# Patient Record
Sex: FEMALE | Race: ASIAN | NOT HISPANIC OR LATINO | URBAN - METROPOLITAN AREA
[De-identification: names, ages, dates, MRNs, and addresses within clinical notes are randomized per-mention and may not be internally consistent; named-entity substitution may affect disease eponyms.]

---

## 2019-01-23 ENCOUNTER — INPATIENT (INPATIENT)
Facility: HOSPITAL | Age: 84
LOS: 6 days | Discharge: HOSPICE | End: 2019-01-30
Attending: INTERNAL MEDICINE | Admitting: INTERNAL MEDICINE
Payer: MEDICARE

## 2019-01-23 VITALS
DIASTOLIC BLOOD PRESSURE: 80 MMHG | HEIGHT: 60 IN | OXYGEN SATURATION: 98 % | RESPIRATION RATE: 16 BRPM | TEMPERATURE: 97 F | WEIGHT: 125.66 LBS | SYSTOLIC BLOOD PRESSURE: 127 MMHG | HEART RATE: 76 BPM

## 2019-01-23 DIAGNOSIS — R62.7 ADULT FAILURE TO THRIVE: ICD-10-CM

## 2019-01-23 LAB
BASOPHILS # BLD AUTO: 0.03 K/UL — SIGNIFICANT CHANGE UP (ref 0–0.2)
BASOPHILS NFR BLD AUTO: 0.3 % — SIGNIFICANT CHANGE UP (ref 0–2)
EOSINOPHIL # BLD AUTO: 0.36 K/UL — SIGNIFICANT CHANGE UP (ref 0–0.5)
EOSINOPHIL NFR BLD AUTO: 3.3 % — SIGNIFICANT CHANGE UP (ref 0–6)
HCT VFR BLD CALC: 38.2 % — SIGNIFICANT CHANGE UP (ref 34.5–45)
HGB BLD-MCNC: 12 G/DL — SIGNIFICANT CHANGE UP (ref 11.5–15.5)
IMM GRANULOCYTES NFR BLD AUTO: 0.9 % — SIGNIFICANT CHANGE UP (ref 0–1.5)
LYMPHOCYTES # BLD AUTO: 1.38 K/UL — SIGNIFICANT CHANGE UP (ref 1–3.3)
LYMPHOCYTES # BLD AUTO: 12.7 % — LOW (ref 13–44)
MCHC RBC-ENTMCNC: 30 PG — SIGNIFICANT CHANGE UP (ref 27–34)
MCHC RBC-ENTMCNC: 31.4 % — LOW (ref 32–36)
MCV RBC AUTO: 95.5 FL — SIGNIFICANT CHANGE UP (ref 80–100)
MONOCYTES # BLD AUTO: 0.8 K/UL — SIGNIFICANT CHANGE UP (ref 0–0.9)
MONOCYTES NFR BLD AUTO: 7.4 % — SIGNIFICANT CHANGE UP (ref 2–14)
NEUTROPHILS # BLD AUTO: 8.16 K/UL — HIGH (ref 1.8–7.4)
NEUTROPHILS NFR BLD AUTO: 75.4 % — SIGNIFICANT CHANGE UP (ref 43–77)
NRBC # FLD: 0.02 K/UL — LOW (ref 25–125)
PLATELET # BLD AUTO: 138 K/UL — LOW (ref 150–400)
PMV BLD: 13.1 FL — HIGH (ref 7–13)
RBC # BLD: 4 M/UL — SIGNIFICANT CHANGE UP (ref 3.8–5.2)
RBC # FLD: 13.7 % — SIGNIFICANT CHANGE UP (ref 10.3–14.5)
WBC # BLD: 10.83 K/UL — HIGH (ref 3.8–10.5)
WBC # FLD AUTO: 10.83 K/UL — HIGH (ref 3.8–10.5)

## 2019-01-23 RX ORDER — INFLUENZA VIRUS VACCINE 15; 15; 15; 15 UG/.5ML; UG/.5ML; UG/.5ML; UG/.5ML
0.5 SUSPENSION INTRAMUSCULAR ONCE
Qty: 0 | Refills: 0 | Status: COMPLETED | OUTPATIENT
Start: 2019-01-23 | End: 2019-01-23

## 2019-01-23 NOTE — H&P ADULT - NSHPLABSRESULTS_GEN_ALL_CORE
12.0   10.83 )-----------( 138      ( 23 Jan 2019 22:55 )             38.2     Chem panel pending    Coags pending     [facility] 1/23 Labs                13.5  11.3 )-----------( 137  74% Neutrophils                   43.2       Chem Panel   - Na 143, Chloride 113, CO2 20, Glucose 232, BUN 7, Cr. 0.53, Calcium 10.2    PTT 58.4 (pt. accepted with heparin running at 4.9 units)    BCx negative, UCx + Proteus (sensitive to CTX)    Imaging at [facility] -     LE Duplex = positive for LLE acute DVT  CTH = negative for intracranial hemorrhage  TTE = LV hypertrophy, preserved LVEF, normal RV systolic function, moderate mitral regurgitation   CXR 1/20 - progression of bibasilar opacities ; 1/22 - increased right basilar infiltrate, large hiatal hernia 12.0   10.83 )-----------( 138      ( 23 Jan 2019 22:55 )             38.2     01-23    138  |  114<H>  |  7   ----------------------------<  192<H>  5.7<H>   |  15<L>  |  0.48<L>    Ca    10.0      23 Jan 2019 22:55  Phos  3.0     01-23  Mg     1.9     01-23    TPro  5.9<L>  /  Alb  1.9<L>  /  TBili  0.2  /  DBili  x   /  AST  52<H>  /  ALT  13  /  AlkPhos  65  01-23    Coags pending     [facility] 1/23 Labs                13.5  11.3 )-----------( 137  74% Neutrophils                   43.2     Chem Panel 1/23 at 7AM  - Na 143, Chloride 113, CO2 20, Glucose 232, BUN 7, Cr. 0.53, Calcium 10.2    PTT 58.4 (pt. accepted with heparin running at 4.9 units)    BCx negative, UCx + Proteus (sensitive to CTX)    Imaging at [facility] -     LE Duplex = positive for LLE acute DVT  CTH = negative for intracranial hemorrhage  TTE = LV hypertrophy, preserved LVEF, normal RV systolic function, moderate mitral regurgitation   CXR 1/20 - progression of bibasilar opacities ; 1/22 - increased right basilar infiltrate, large hiatal hernia

## 2019-01-23 NOTE — H&P ADULT - PROBLEM SELECTOR PLAN 1
- at baseline pt. has advanced dementia (non-verbal, bedbound)  - presented with worsening lethargy of 3 days duration at [facility] in setting of sepsis from UTI vs. PNA (possible aspiration event)   - per outside hospital documentation, mental status close to baseline per family after few days of treatment  - management of UTI and PNA as below  - failed speech and swallow, was planned for PEG with GI 1/24 had pt. not been transferred, discuss with family in morning and reconsult GI if goals remain the same  - fall and aspiration precautions

## 2019-01-23 NOTE — H&P ADULT - PROBLEM SELECTOR PLAN 9
- DVT ppx: therapeutically anticoagulated on heparin drip - attempted to reach daughter Cait Iglesias, however no answer  - need goals of care discussion regarding code status, confirm whether family wants PEG, possible palliative consult based on conversation  - FULL CODE, given that unable to reach family, and not documentation to suggest otherwise on transfer paperwork

## 2019-01-23 NOTE — H&P ADULT - NSHPPHYSICALEXAM_GEN_ALL_CORE
Vital Signs Last 24 Hrs  T(C): 36.2 (23 Jan 2019 21:52), Max: 36.2 (23 Jan 2019 21:52)  T(F): 97.1 (23 Jan 2019 21:52), Max: 97.1 (23 Jan 2019 21:52)  HR: 76 (23 Jan 2019 21:52) (76 - 76)  BP: 127/80 (23 Jan 2019 21:52) (127/80 - 127/80)  RR: 16 (23 Jan 2019 21:52) (16 - 16)  SpO2: 98% (23 Jan 2019 21:52) (98% - 98%)    General: WN/WD NAD  Neurology: responsive to sternal rub, spontaneously moving all extremities  Eyes: PERRLA  ENT/Neck: Neck supple, trachea midline, No JVD, Gross hearing intact  Respiratory: CTA B/L, No wheezing, rales, rhonchi  CV: RRR, S1S2, no murmurs, rubs or gallops  Abdominal: Soft, NT, ND +BS,   Extremities: No edema, + peripheral pulses, b/l UE edema   Skin: No Rashes, Hematoma, Ecchymosis Vital Signs Last 24 Hrs  T(C): 36.2 (23 Jan 2019 21:52), Max: 36.2 (23 Jan 2019 21:52)  T(F): 97.1 (23 Jan 2019 21:52), Max: 97.1 (23 Jan 2019 21:52)  HR: 76 (23 Jan 2019 21:52) (76 - 76)  BP: 127/80 (23 Jan 2019 21:52) (127/80 - 127/80)  RR: 16 (23 Jan 2019 21:52) (16 - 16)  SpO2: 98% (23 Jan 2019 21:52) (98% - 98%)    General: WN/WD NAD  Neurology: non-verbal at baseline, responds to verbal and painful stimuli, spontaneously moving all extremities  Eyes: PERRLA  ENT/Neck: Neck supple, trachea midline, No JVD, Gross hearing intact  Respiratory: coarse breath sounds bilaterally, No wheezing, rales, rhonchi  CV: RRR, S1S2, no murmurs, rubs or gallops  Abdominal: Soft, NT, ND +BS,   Extremities: No edema, + peripheral pulses, b/l UE edema   Skin: No Rashes, Hematoma, Ecchymosis

## 2019-01-23 NOTE — H&P ADULT - PMH
Diabetes Mellitus    GERD (Gastroesophageal Reflux Disease)    H/O: Osteoarthritis    HTN (Hypertension), Benign

## 2019-01-23 NOTE — PATIENT PROFILE ADULT - NSPROGENARRIVEDFROM_GEN_A_NUR
\[facility] [place] [facility]\"" [facility]

## 2019-01-23 NOTE — H&P ADULT - PROBLEM SELECTOR PLAN 5
- at [facility], initially hypernatremic to 150s, started on D5W, later transitioned to D5 1/2 NS, 1/23 7AM Na 143 prior to transfer, [facility] admission labs with Na 138  - likely 2/2 no PO intake, failed speech and swallow, NGT placement at OSH unsuccessful after multiple attempts  - will c/w D5 1/2 NS at 50cc/hour for now  - trend daily BMP

## 2019-01-23 NOTE — H&P ADULT - PROBLEM SELECTOR PLAN 8
- discussed with daughter Shaka Iglesias [phone]  - - transfer paperwork without list of home medications   - unable to reach family at home, discuss in morning regarding home medication list, and resume IV equivalents as indicated as pt. NPO currently without NG tube

## 2019-01-23 NOTE — H&P ADULT - PROBLEM SELECTOR PLAN 4
- UCx at [facility] with Proteus, sensitive to ceftriaxone   - s/p ceftriaxone for 6 days (1/19 - 1/24)  - will hold off further abx for now, pt. arrived with tommie from outside hospital  - consider trial of void - UCx at [facility] with Proteus, sensitive to ceftriaxone   - s/p ceftriaxone for 4 days (1/19 -1/22, transferred on 1/23)  - will give 1 dose of ceftriaxone now to complete 5 day course  - reilly in place from outside hospital, consider trial of void

## 2019-01-23 NOTE — H&P ADULT - HISTORY OF PRESENT ILLNESS
History obtained from [facility] Transfer Paperwork.     88F hx of advanced dementia (at baseline, bedbound, nonverbal, can track and take PO feeds), HTN, HLD, DM, who presented to [facility] on 1/19 with altered mental status, cough, and shortness of breath. Pt. had productive cough with yellow sputum, associated with SOB, and experienced subjective fevers at home over the past three days prior to admission. She also had decreased PO intake, home diet usually consists of pureed food and thickened fluids, which pt. was noted to be coughing with. In addition, pts. urine had also appeared pink to HHA/daughter. Mental status also worsened with pt. becoming more lethargic prompting family to bring her to the ED. Upon arrival pt was placed on BIPAP and admitted to MICU. CTH was negative for acute bleed, and encephalopathy was attributed to sepsis possibly 2/2 PNA. She was started on ceftriaxone for possible UTI  and PNA. Chest XRAY on admission showed no acute pulmonary abnormality, however CXR from 1/22 showed increase in right basilar opacity. Further infectious workup - BCx remained negative, UCx positive for proteus sensitive to ceftriaxone. Course was complicated by acute LLE DVT for which she was started on a patient specific heparin drip (goal 50-60, due to high risk of bleeding). She was also hypernatremic to the 150s, and started on D5W initially, later switched to D5 1/2 NS, with Na level 143 prior to transfer. Multiple attempts to place an NG tube was unsuccessful given large hiatal hernia. Mental status gradually improved, BIPAP de-escalated to NC, and pt. was transferred to medicine floors. Per family pt. was back to her baseline. Floor course c/b episode of respiratory distress, however BIPAP was not started given secretions and lethargy, oxygenation wnl with 5L NC. ABG was unremarkable for CO2 retention. Speech and swallow was consulted, not recommended for PEG tube. Palliative care was consulted however family requested transfer to [facility]. GI was consulted and PEG was tentatively scheduled for 1/24 AM if pt. was not transferred by that time.     On arrival to [facility] - pt. on heparin drip at 4.9 units, and D5 1/2 NS @50cc/hr. PTT prior to transfer noted to be 58. History obtained from [facility] Transfer Paperwork.     88F hx of advanced dementia (at baseline, bedbound, nonverbal, can track and take PO feeds), HTN, HLD, DM, who presented to [facility] on 1/19 with altered mental status, cough, and shortness of breath. Pt. had productive cough with yellow sputum, associated with SOB, and experienced subjective fevers at home over the past three days prior to admission. She also had decreased PO intake, home diet usually consists of pureed food and thickened fluids, which pt. was noted to be coughing with. In addition, pts. urine had also appeared pink to HHA/daughter. Mental status also worsened with pt. becoming more lethargic prompting family to bring her to the ED. Upon arrival pt was placed on BIPAP and admitted to MICU. CTH was negative for acute bleed, and encephalopathy was attributed to sepsis possibly 2/2 PNA. She was started on ceftriaxone for possible UTI  and PNA. Chest XRAY on admission showed no acute pulmonary abnormality, however CXR from 1/22 showed increase in right basilar opacity. Further infectious workup - BCx remained negative, UCx positive for proteus sensitive to ceftriaxone. Course was complicated by acute LLE DVT for which she was started on a patient specific heparin drip (goal 50-60, due to high risk of bleeding). She was also hypernatremic to the 150s, and started on D5W initially, later switched to D5 1/2 NS, with Na level 143 prior to transfer. Multiple attempts to place an NG tube was unsuccessful given large hiatal hernia. Mental status gradually improved, BIPAP de-escalated to NC, and pt. was transferred to medicine floors. Per family pt. was back to her baseline. Floor course c/b episode of respiratory distress, however BIPAP was not started given secretions and lethargy, oxygenation wnl with 5L NC. ABG was unremarkable for CO2 retention. Speech and swallow was consulted, not recommended for PEG tube. Palliative care was consulted however family requested transfer to [facility]. GI was consulted and PEG was tentatively scheduled for 1/24 AM if pt. was not transferred by that time.     On arrival to [facility] - pt. on heparin drip at 490 units/hr and D5 1/2 NS @50cc/hr. PTT prior to transfer noted to be 58.

## 2019-01-23 NOTE — H&P ADULT - PROBLEM SELECTOR PLAN 3
- p/w cough and subjective fevers, CXR 1/22 with increased right basilar opacity   - likely aspiration PNA, as pt. was coughing with soft foods at home  - pt. completed 6 day course of ceftriaxone and azithromycin (1/19-1/24)  - given that pt. is afebrile, will hold off abx for now as she completed adequate treatment for CAP, however not adequately treated for aspiration PNA given no anaerobic coverage with CTX/Azithromycin  - if pt. develops fever, start zosyn for aspiration PNA - p/w cough and subjective fevers, CXR 1/22 with increased right basilar opacity   - likely aspiration PNA, as pt. was coughing with soft foods at home  - pt. completed 4 days of ceftriaxone and azithromycin (1/19 -1/22, transferred on 1/23)  - will give 1 dose of CTX to complete 5 day duration for CAP, however pt. not adequately treated for aspiration PNA given no anaerobic coverage with CTX/Azithromycin  - if pt. develops fever, start zosyn for aspiration PNA

## 2019-01-23 NOTE — H&P ADULT - PROBLEM SELECTOR PLAN 6
- controlled - controlled  - unclear home medication list  - pt. NPO, will administer IV antihypertensives as needed  - vital signs per routine

## 2019-01-23 NOTE — PATIENT PROFILE ADULT - STATED REASON FOR ADMISSION
Pt at [facility] admitted with cold and difficulty swallowing.. has been on puree for years

## 2019-01-23 NOTE — H&P ADULT - ASSESSMENT
88F hx of advanced dementia (at baseline, bedbound, nonverbal, can track and take PO feeds), HTN, HLD, DM, who presented to [facility] 1/19 with AMS, cough, subjective fevers, admitted to MICU on BIPAP, and started on CTX/azithromycin for sepsis 2/2 PNA and/or UTI. Workup positive for UCx + Proteus (sensitive to CTX) and right basilar opacity on CXR. Course c/b on LLE DVT, on patient specific heparin gtt (goal 50-60 due to high bleeding risk - hematuria) and hypernatremia to 150s, now resolved with IVF. Mental status improved, however patient failed speech and swallow, with GI planning tentative PEG tube placement at OSH on 1/24. Palliative was consulted, however family requested transfer to [facility]. Pt. transferred to [facility] on 1/24 for further management.

## 2019-01-23 NOTE — H&P ADULT - ATTENDING COMMENTS
seen and examined  above HPI, PMH, SH, FH, Meds, Allergies and ROS noted and personally confirmed as accurate by me with family at bedside    PHYSICAL EXAM: vital signs as above  in no apparent distress  Neck: Supple, no JVD, no thyromegaly  Lungs: no rhonchi, scattered bilateral wheeze, no crackles very poor cooperation  CVS: S1 S2 soft ejection systolic murmur best heard at left sternal border   Abdomen: no tenderness, no organomegaly, BS present  Neuro: minimally responsive no focal weakness  Psych: minimally responsive   Skin: warm, dry  Ext: no cyanosis or clubbing, no edema  Msk: no joint swelling or deformities  Back: no CVA tenderness, no     Plan: d/w Dtr Mary Grace (AKA Cait)  no PEG  comfort care  does want antibiotics transfusions etc  no invasive intervention required at this time    ID evaluation Dr Givens will see  may require antibiotics for possible persistent HCAP  defer to ID    prognosis overall endstage

## 2019-01-23 NOTE — PATIENT PROFILE ADULT - FALL HARM RISK
coagulation(Bleeding disorder R/T clinical cond/anti-coags)/bones(Osteoporosis,prev fx,steroid use,metastatic bone ca)/age(85 years old or older)

## 2019-01-23 NOTE — H&P ADULT - PSH
History of Arthroscopy    History of Arthroscopy of Knee -  right    History of Total Knee  left in 2003

## 2019-01-23 NOTE — H&P ADULT - PROBLEM SELECTOR PLAN 2
- acute LLE DVT at outside hospital   - started on heparin drip with patient specific normogram, goal PTT 50-60 given high risk of bleeding (hematuria)  - heparin drip current at 4.9 units, PTT at outside hospital prior to transfer 58, will recheck and monitor PTT [id number] - acute LLE DVT at outside hospital   - started on heparin drip with patient specific normogram, goal PTT 50-60 given high risk of bleeding (hematuria)  - heparin drip current at 490 units/hr from outside hospital, PTT at outside hospital prior to transfer 58, will recheck and monitor PTT [id number]

## 2019-01-24 DIAGNOSIS — I82.402 ACUTE EMBOLISM AND THROMBOSIS OF UNSPECIFIED DEEP VEINS OF LEFT LOWER EXTREMITY: ICD-10-CM

## 2019-01-24 DIAGNOSIS — N39.0 URINARY TRACT INFECTION, SITE NOT SPECIFIED: ICD-10-CM

## 2019-01-24 DIAGNOSIS — Z51.5 ENCOUNTER FOR PALLIATIVE CARE: ICD-10-CM

## 2019-01-24 DIAGNOSIS — I10 ESSENTIAL (PRIMARY) HYPERTENSION: ICD-10-CM

## 2019-01-24 DIAGNOSIS — Z71.89 OTHER SPECIFIED COUNSELING: ICD-10-CM

## 2019-01-24 DIAGNOSIS — E11.9 TYPE 2 DIABETES MELLITUS WITHOUT COMPLICATIONS: ICD-10-CM

## 2019-01-24 DIAGNOSIS — Z79.899 OTHER LONG TERM (CURRENT) DRUG THERAPY: ICD-10-CM

## 2019-01-24 DIAGNOSIS — F03.90 UNSPECIFIED DEMENTIA WITHOUT BEHAVIORAL DISTURBANCE: ICD-10-CM

## 2019-01-24 DIAGNOSIS — E87.0 HYPEROSMOLALITY AND HYPERNATREMIA: ICD-10-CM

## 2019-01-24 DIAGNOSIS — G93.40 ENCEPHALOPATHY, UNSPECIFIED: ICD-10-CM

## 2019-01-24 DIAGNOSIS — R53.2 FUNCTIONAL QUADRIPLEGIA: ICD-10-CM

## 2019-01-24 DIAGNOSIS — R13.10 DYSPHAGIA, UNSPECIFIED: ICD-10-CM

## 2019-01-24 DIAGNOSIS — J18.9 PNEUMONIA, UNSPECIFIED ORGANISM: ICD-10-CM

## 2019-01-24 DIAGNOSIS — Z29.9 ENCOUNTER FOR PROPHYLACTIC MEASURES, UNSPECIFIED: ICD-10-CM

## 2019-01-24 LAB
ALBUMIN SERPL ELPH-MCNC: 1.9 G/DL — LOW (ref 3.3–5)
ALP SERPL-CCNC: 65 U/L — SIGNIFICANT CHANGE UP (ref 40–120)
ALT FLD-CCNC: 13 U/L — SIGNIFICANT CHANGE UP (ref 4–33)
ANION GAP SERPL CALC-SCNC: 10 MMO/L — SIGNIFICANT CHANGE UP (ref 7–14)
ANION GAP SERPL CALC-SCNC: 8 MMO/L — SIGNIFICANT CHANGE UP (ref 7–14)
ANION GAP SERPL CALC-SCNC: 9 MMO/L — SIGNIFICANT CHANGE UP (ref 7–14)
APTT BLD: 33.3 SEC — SIGNIFICANT CHANGE UP (ref 27.5–36.3)
APTT BLD: 45.2 SEC — HIGH (ref 27.5–36.3)
APTT BLD: 79.8 SEC — HIGH (ref 27.5–36.3)
AST SERPL-CCNC: 52 U/L — HIGH (ref 4–32)
BILIRUB SERPL-MCNC: 0.2 MG/DL — SIGNIFICANT CHANGE UP (ref 0.2–1.2)
BUN SERPL-MCNC: 6 MG/DL — LOW (ref 7–23)
BUN SERPL-MCNC: 7 MG/DL — SIGNIFICANT CHANGE UP (ref 7–23)
BUN SERPL-MCNC: 7 MG/DL — SIGNIFICANT CHANGE UP (ref 7–23)
CALCIUM SERPL-MCNC: 10 MG/DL — SIGNIFICANT CHANGE UP (ref 8.4–10.5)
CALCIUM SERPL-MCNC: 10.1 MG/DL — SIGNIFICANT CHANGE UP (ref 8.4–10.5)
CALCIUM SERPL-MCNC: 9.7 MG/DL — SIGNIFICANT CHANGE UP (ref 8.4–10.5)
CHLORIDE SERPL-SCNC: 113 MMOL/L — HIGH (ref 98–107)
CHLORIDE SERPL-SCNC: 114 MMOL/L — HIGH (ref 98–107)
CHLORIDE SERPL-SCNC: 114 MMOL/L — HIGH (ref 98–107)
CO2 SERPL-SCNC: 14 MMOL/L — LOW (ref 22–31)
CO2 SERPL-SCNC: 15 MMOL/L — LOW (ref 22–31)
CO2 SERPL-SCNC: 20 MMOL/L — LOW (ref 22–31)
CREAT SERPL-MCNC: 0.48 MG/DL — LOW (ref 0.5–1.3)
CREAT SERPL-MCNC: 0.49 MG/DL — LOW (ref 0.5–1.3)
CREAT SERPL-MCNC: 0.56 MG/DL — SIGNIFICANT CHANGE UP (ref 0.5–1.3)
GLUCOSE BLDC GLUCOMTR-MCNC: 121 MG/DL — HIGH (ref 70–99)
GLUCOSE BLDC GLUCOMTR-MCNC: 157 MG/DL — HIGH (ref 70–99)
GLUCOSE BLDC GLUCOMTR-MCNC: 160 MG/DL — HIGH (ref 70–99)
GLUCOSE BLDC GLUCOMTR-MCNC: 203 MG/DL — HIGH (ref 70–99)
GLUCOSE SERPL-MCNC: 192 MG/DL — HIGH (ref 70–99)
GLUCOSE SERPL-MCNC: 206 MG/DL — HIGH (ref 70–99)
GLUCOSE SERPL-MCNC: 214 MG/DL — HIGH (ref 70–99)
HCT VFR BLD CALC: 39.3 % — SIGNIFICANT CHANGE UP (ref 34.5–45)
HGB BLD-MCNC: 11.7 G/DL — SIGNIFICANT CHANGE UP (ref 11.5–15.5)
MAGNESIUM SERPL-MCNC: 1.9 MG/DL — SIGNIFICANT CHANGE UP (ref 1.6–2.6)
MAGNESIUM SERPL-MCNC: 1.9 MG/DL — SIGNIFICANT CHANGE UP (ref 1.6–2.6)
MANUAL SMEAR VERIFICATION: SIGNIFICANT CHANGE UP
MCHC RBC-ENTMCNC: 29.8 % — LOW (ref 32–36)
MCHC RBC-ENTMCNC: 29.8 PG — SIGNIFICANT CHANGE UP (ref 27–34)
MCV RBC AUTO: 100 FL — SIGNIFICANT CHANGE UP (ref 80–100)
MORPHOLOGY BLD-IMP: NORMAL — SIGNIFICANT CHANGE UP
NRBC # FLD: 0.02 K/UL — LOW (ref 25–125)
PHOSPHATE SERPL-MCNC: 2.6 MG/DL — SIGNIFICANT CHANGE UP (ref 2.5–4.5)
PHOSPHATE SERPL-MCNC: 3 MG/DL — SIGNIFICANT CHANGE UP (ref 2.5–4.5)
PLATELET # BLD AUTO: 52 K/UL — LOW (ref 150–400)
PLATELET COUNT - ESTIMATE: SIGNIFICANT CHANGE UP
PMV BLD: 13 FL — SIGNIFICANT CHANGE UP (ref 7–13)
POTASSIUM SERPL-MCNC: 3.2 MMOL/L — LOW (ref 3.5–5.3)
POTASSIUM SERPL-MCNC: 5.7 MMOL/L — HIGH (ref 3.5–5.3)
POTASSIUM SERPL-MCNC: SIGNIFICANT CHANGE UP MMOL/L (ref 3.5–5.3)
POTASSIUM SERPL-SCNC: 3.2 MMOL/L — LOW (ref 3.5–5.3)
POTASSIUM SERPL-SCNC: 5.7 MMOL/L — HIGH (ref 3.5–5.3)
POTASSIUM SERPL-SCNC: SIGNIFICANT CHANGE UP MMOL/L (ref 3.5–5.3)
PROT SERPL-MCNC: 5.9 G/DL — LOW (ref 6–8.3)
RBC # BLD: 3.93 M/UL — SIGNIFICANT CHANGE UP (ref 3.8–5.2)
RBC # FLD: 13.9 % — SIGNIFICANT CHANGE UP (ref 10.3–14.5)
SODIUM SERPL-SCNC: 137 MMOL/L — SIGNIFICANT CHANGE UP (ref 135–145)
SODIUM SERPL-SCNC: 138 MMOL/L — SIGNIFICANT CHANGE UP (ref 135–145)
SODIUM SERPL-SCNC: 142 MMOL/L — SIGNIFICANT CHANGE UP (ref 135–145)
WBC # BLD: 7.14 K/UL — SIGNIFICANT CHANGE UP (ref 3.8–10.5)
WBC # FLD AUTO: 7.14 K/UL — SIGNIFICANT CHANGE UP (ref 3.8–10.5)

## 2019-01-24 PROCEDURE — 99497 ADVNCD CARE PLAN 30 MIN: CPT | Mod: 25

## 2019-01-24 PROCEDURE — 99222 1ST HOSP IP/OBS MODERATE 55: CPT

## 2019-01-24 PROCEDURE — 99223 1ST HOSP IP/OBS HIGH 75: CPT

## 2019-01-24 PROCEDURE — 71045 X-RAY EXAM CHEST 1 VIEW: CPT | Mod: 26

## 2019-01-24 RX ORDER — DEXTROSE 50 % IN WATER 50 %
12.5 SYRINGE (ML) INTRAVENOUS ONCE
Qty: 0 | Refills: 0 | Status: DISCONTINUED | OUTPATIENT
Start: 2019-01-24 | End: 2019-01-26

## 2019-01-24 RX ORDER — CEFTRIAXONE 500 MG/1
1 INJECTION, POWDER, FOR SOLUTION INTRAMUSCULAR; INTRAVENOUS ONCE
Qty: 0 | Refills: 0 | Status: COMPLETED | OUTPATIENT
Start: 2019-01-24 | End: 2019-01-24

## 2019-01-24 RX ORDER — HEPARIN SODIUM 5000 [USP'U]/ML
700 INJECTION INTRAVENOUS; SUBCUTANEOUS
Qty: 25000 | Refills: 0 | Status: DISCONTINUED | OUTPATIENT
Start: 2019-01-24 | End: 2019-01-24

## 2019-01-24 RX ORDER — DEXTROSE 50 % IN WATER 50 %
15 SYRINGE (ML) INTRAVENOUS ONCE
Qty: 0 | Refills: 0 | Status: DISCONTINUED | OUTPATIENT
Start: 2019-01-24 | End: 2019-01-26

## 2019-01-24 RX ORDER — SODIUM CHLORIDE 9 MG/ML
1000 INJECTION, SOLUTION INTRAVENOUS
Qty: 0 | Refills: 0 | Status: DISCONTINUED | OUTPATIENT
Start: 2019-01-24 | End: 2019-01-30

## 2019-01-24 RX ORDER — INSULIN LISPRO 100/ML
VIAL (ML) SUBCUTANEOUS EVERY 6 HOURS
Qty: 0 | Refills: 0 | Status: DISCONTINUED | OUTPATIENT
Start: 2019-01-24 | End: 2019-01-26

## 2019-01-24 RX ORDER — GLUCAGON INJECTION, SOLUTION 0.5 MG/.1ML
1 INJECTION, SOLUTION SUBCUTANEOUS ONCE
Qty: 0 | Refills: 0 | Status: DISCONTINUED | OUTPATIENT
Start: 2019-01-24 | End: 2019-01-26

## 2019-01-24 RX ORDER — HEPARIN SODIUM 5000 [USP'U]/ML
600 INJECTION INTRAVENOUS; SUBCUTANEOUS
Qty: 25000 | Refills: 0 | Status: DISCONTINUED | OUTPATIENT
Start: 2019-01-24 | End: 2019-01-24

## 2019-01-24 RX ORDER — DEXTROSE 50 % IN WATER 50 %
25 SYRINGE (ML) INTRAVENOUS ONCE
Qty: 0 | Refills: 0 | Status: DISCONTINUED | OUTPATIENT
Start: 2019-01-24 | End: 2019-01-26

## 2019-01-24 RX ORDER — SODIUM CHLORIDE 9 MG/ML
1000 INJECTION, SOLUTION INTRAVENOUS
Qty: 0 | Refills: 0 | Status: DISCONTINUED | OUTPATIENT
Start: 2019-01-24 | End: 2019-01-24

## 2019-01-24 RX ORDER — POTASSIUM CHLORIDE 20 MEQ
10 PACKET (EA) ORAL
Qty: 0 | Refills: 0 | Status: COMPLETED | OUTPATIENT
Start: 2019-01-24 | End: 2019-01-24

## 2019-01-24 RX ORDER — HEPARIN SODIUM 5000 [USP'U]/ML
650 INJECTION INTRAVENOUS; SUBCUTANEOUS
Qty: 25000 | Refills: 0 | Status: DISCONTINUED | OUTPATIENT
Start: 2019-01-24 | End: 2019-01-25

## 2019-01-24 RX ORDER — PIPERACILLIN AND TAZOBACTAM 4; .5 G/20ML; G/20ML
3.38 INJECTION, POWDER, LYOPHILIZED, FOR SOLUTION INTRAVENOUS EVERY 8 HOURS
Qty: 0 | Refills: 0 | Status: COMPLETED | OUTPATIENT
Start: 2019-01-24 | End: 2019-01-27

## 2019-01-24 RX ADMIN — Medication 100 MILLIEQUIVALENT(S): at 04:40

## 2019-01-24 RX ADMIN — CEFTRIAXONE 100 GRAM(S): 500 INJECTION, POWDER, FOR SOLUTION INTRAMUSCULAR; INTRAVENOUS at 02:32

## 2019-01-24 RX ADMIN — Medication 1: at 18:37

## 2019-01-24 RX ADMIN — PIPERACILLIN AND TAZOBACTAM 25 GRAM(S): 4; .5 INJECTION, POWDER, LYOPHILIZED, FOR SOLUTION INTRAVENOUS at 23:06

## 2019-01-24 RX ADMIN — Medication 2: at 06:10

## 2019-01-24 RX ADMIN — Medication 100 MILLIEQUIVALENT(S): at 02:34

## 2019-01-24 RX ADMIN — HEPARIN SODIUM 6 UNIT(S)/HR: 5000 INJECTION INTRAVENOUS; SUBCUTANEOUS at 02:33

## 2019-01-24 RX ADMIN — Medication 1: at 12:13

## 2019-01-24 RX ADMIN — Medication 100 MILLIEQUIVALENT(S): at 03:39

## 2019-01-24 RX ADMIN — SODIUM CHLORIDE 20 MILLILITER(S): 9 INJECTION, SOLUTION INTRAVENOUS at 18:37

## 2019-01-24 RX ADMIN — HEPARIN SODIUM 6.5 UNIT(S)/HR: 5000 INJECTION INTRAVENOUS; SUBCUTANEOUS at 18:37

## 2019-01-24 RX ADMIN — HEPARIN SODIUM 7 UNIT(S)/HR: 5000 INJECTION INTRAVENOUS; SUBCUTANEOUS at 10:53

## 2019-01-24 NOTE — CONSULT NOTE ADULT - PROBLEM SELECTOR RECOMMENDATION 9
Patient with advanced dementia - non-verbal, bedbound, incontinent, not following commands, FAST ~7F.  Continue supportive care.

## 2019-01-24 NOTE — CONSULT NOTE ADULT - SUBJECTIVE AND OBJECTIVE BOX
HPI:  History obtained from [facility] Transfer Paperwork, chart, and family at bedside  Pt has demenita and is a poor historian.  She is bedbound ofr two years. Intermitently can provide one work answer per family      88F hx of advanced dementia (at baseline, bedbound, nonverbal, can track and take PO feeds), HTN, HLD, DM, who presented to [facility] on  with altered mental status, cough, and shortness of breath. Pt. had productive cough with yellow sputum, associated with SOB, and experienced subjective fevers at home over the past three days prior to admission. She also had decreased PO intake, home diet usually consists of pureed food and thickened fluids, which pt. was noted to be coughing with.    In addition, pts. urine had also appeared pink to HHA/daughter. Mental status also worsened with pt. becoming more lethargic prompting family to bring her to the EDat OSH.  Upon arrival pt was placed on BIPAP and admitted to MICU. CTH was negative for acute bleed, and encephalopathy was attributed to sepsis possibly 2/2 PNA. She was started on ceftriaxone for possible UTI  and PNA. Chest XRAY on admission showed no acute pulmonary abnormality, however CXR from  showed increase in right basilar opacity. Further infectious workup - BCx remained negative, UCx positive for proteus sensitive to ceftriaxone.     Course was complicated by acute LLE DVT for which she was started on a patient specific heparin drip (goal 50-60, due to high risk of bleeding). She was also hypernatremic to the 150s, and started on D5W initially, later switched to D5 1/2 NS, with Na level 143 prior to transfer. Multiple attempts to place an NG tube was unsuccessful given large hiatal hernia. Mental status gradually improved, BIPAP de-escalated to NC, and pt. was transferred to medicine floors.     Per family pt. was back to her baseline. Floor course c/b episode of respiratory distress, however BIPAP was not started given secretions and lethargy, oxygenation wnl with 5L NC. ABG was unremarkable for CO2 retention. Speech and swallow was consulted, not recommended for PEG tube. Palliative care was consulted however family requested transfer to [facility]. GI was consulted and PEG was tentatively scheduled for  AM if pt. was not transferred by that time.     On arrival to [facility] - pt. on heparin drip at 490 units/hr and D5 1/2 NS @50cc/hr. PTT prior to transfer noted to be 58. (2019 23:36)    At this time, pt is requiring a facew mask. She is afebrile but lethargic.        PAST MEDICAL & SURGICAL HISTORY:  H/O: Osteoarthritis  GERD (Gastroesophageal Reflux Disease)  Diabetes Mellitus  HTN (Hypertension), Benign  History of Arthroscopy of Knee -  right  History of Arthroscopy  History of Total Knee  left in       Allergies    No Known Allergies    Intolerances        ANTIMICROBIALS:  piperacillin/tazobactam IVPB. 3.375 every 8 hours      OTHER MEDS:  dextrose 40% Gel 15 Gram(s) Oral once PRN  dextrose 5% + sodium chloride 0.45%. 1000 milliLiter(s) IV Continuous <Continuous>  dextrose 5%. 1000 milliLiter(s) IV Continuous <Continuous>  dextrose 50% Injectable 12.5 Gram(s) IV Push once  dextrose 50% Injectable 25 Gram(s) IV Push once  dextrose 50% Injectable 25 Gram(s) IV Push once  glucagon  Injectable 1 milliGRAM(s) IntraMuscular once PRN  heparin  Infusion 700 Unit(s)/Hr IV Continuous <Continuous>  influenza   Vaccine 0.5 milliLiter(s) IntraMuscular once  insulin lispro (HumaLOG) corrective regimen sliding scale   SubCutaneous every 6 hours      SOCIAL HISTORY:  from [facility] in  for 30 years  No tobacco  Lives with daughter      FAMILY HISTORY:  No pertinent family history in first degree relatives  Sister  at 70 ov complications from CVA      REVIEW OF SYSTEMS  [ x ] ROS unobtainable because of patients dementia   [  ] All other systems negative except as noted below:	    Constitutional:  [ ] fever [ ] chills  [ ] weight loss  [ ] weakness  Skin:  [ ] rash [ ] phlebitis	  Eyes: [ ] icterus [ ] pain  [ ] discharge	  ENMT: [ ] sore throat  [ ] thrush [ ] ulcers [ ] exudates  Respiratory: [ ] dyspnea [ ] hemoptysis [ ] cough [ ] sputum	  Cardiovascular:  [ ] chest pain [ ] palpitations [ ] edema	  Gastrointestinal:  [ ] nausea [ ] vomiting [ ] diarrhea [ ] constipation [ ] pain	  Genitourinary:  [ ] dysuria [ ] frequency [ ] hematuria [ ] discharge [ ] flank pain  [ ] incontinence  Musculoskeletal:  [ ] myalgias [ ] arthralgias [ ] arthritis  [ ] back pain  Neurological:  [ ] headache [ ] seizures  [ ] confusion/altered mental status  Psychiatric:  [ ] anxiety [ ] depression	  Hematology/Lymphatics:  [ ] lymphadenopathy  Endocrine:  [ ] adrenal [ ] thyroid  Allergic/Immunologic:	 [ ] transplant [ ] seasonal    PHYSICAL EXAM:  General: [x ] thin, mild distress  HEAD/EYES: [ ] PERRL [x ] white sclera [ ] icterus  ENT:  [ ] normal [x ] supple [ ] thrush [ ] pharyngeal exudate  Cardiovascular:   [ ] murmur [x ] normal [ ] PPM/AICD  Respiratory:  [x ] course BS  GI:  [ x] soft, non-tender, normal bowel sounds  :  [ x] reilly [ ] no CVA tenderness   Musculoskeletal:  [x ] no synovitis  Neurologic:  [x ] non-verbal  Skin:  x[ ] no rash, small right heal deep tissue injury  Lymph: [ x] no lymphadenopathy  Psychiatric:  [ ] appropriate affect [ ] alert & oriented  Lines:  [x ] no phlebitis [ ] central line          Drug Dosing Weight  Height (cm): 152.4 (2019 21:52)  Weight (kg): 57 (2019 21:52)  BMI (kg/m2): 24.5 (2019 21:52)  BSA (m2): 1.53 (2019 21:52)    Vital Signs Last 24 Hrs  T(F): 97.2 (19 @ 13:45), Max: 97.6 (19 @ 05:10)    Vital Signs Last 24 Hrs  HR: 60 (19 @ 13:45) (58 - 76)  BP: 169/57 (19 @ 13:45) (127/80 - 169/75)  RR: 18 (19 @ 13:45)  SpO2: 100% (19 @ 13:45) (85% - 100%)  Wt(kg): --                          11.7   7.14  )-----------( 52       ( 2019 05:10 )             39.3           137  |  113<H>  |  6<L>  ----------------------------<  214<H>  Test not performed SPECIMEN SEVERELY HEMOLYZED   |  14<L>  |  0.49<L>    Ca    9.7      2019 05:10  Phos  2.6       Mg     1.9         TPro  5.9<L>  /  Alb  1.9<L>  /  TBili  0.2  /  DBili  x   /  AST  52<H>  /  ALT  13  /  AlkPhos  65            MICROBIOLOGY:    RADIOLOGY:  < from: Xray Chest 1 View- PORTABLE-Urgent (19 @ 13:21) >  IMPRESSION:  Limited image.    Hiatus hernia.    Patchy right lower lung opacity which can be consistent with pneumonia.    Small right pleural effusion with likely associated passive atelectasis.    Left basilar opacity which could be due to small pleural effusion with   passive atelectasis, atelectasis of other cause, and/or pneumonia.    1.  Suggest follow-up.    < end of copied text >    Images reviewed by me and I agree with left base opacity

## 2019-01-24 NOTE — CONSULT NOTE ADULT - ASSESSMENT
88F hx of advanced dementia (at baseline, bedbound, nonverbal, can track and take PO feeds), HTN, HLD, DM, who presented to [facility] on 1/19 with altered mental status, cough, and shortness of breath.  Palliative consulted for goals of care.

## 2019-01-24 NOTE — CONSULT NOTE ADULT - ATTENDING COMMENTS
Patient seen and examined.  Meds, labs and vitals all reviewed.  Patient examined by physician.  Agree with NP note

## 2019-01-24 NOTE — CONSULT NOTE ADULT - SUBJECTIVE AND OBJECTIVE BOX
HPI: Obtained from H&P   History obtained from [facility] Transfer Paperwork.     88F hx of advanced dementia (at baseline, bedbound, nonverbal, can track and take PO feeds), HTN, HLD, DM, who presented to [facility] on 1/19 with altered mental status, cough, and shortness of breath. Pt. had productive cough with yellow sputum, associated with SOB, and experienced subjective fevers at home over the past three days prior to admission. She also had decreased PO intake, home diet usually consists of pureed food and thickened fluids, which pt. was noted to be coughing with. In addition, pts. urine had also appeared pink to HHA/daughter. Mental status also worsened with pt. becoming more lethargic prompting family to bring her to the ED. Upon arrival pt was placed on BIPAP and admitted to MICU. CTH was negative for acute bleed, and encephalopathy was attributed to sepsis possibly 2/2 PNA. She was started on ceftriaxone for possible UTI  and PNA. Chest XRAY on admission showed no acute pulmonary abnormality, however CXR from 1/22 showed increase in right basilar opacity. Further infectious workup - BCx remained negative, UCx positive for proteus sensitive to ceftriaxone. Course was complicated by acute LLE DVT for which she was started on a patient specific heparin drip (goal 50-60, due to high risk of bleeding). She was also hypernatremic to the 150s, and started on D5W initially, later switched to D5 1/2 NS, with Na level 143 prior to transfer. Multiple attempts to place an NG tube was unsuccessful given large hiatal hernia. Mental status gradually improved, BIPAP de-escalated to NC, and pt. was transferred to medicine floors. Per family pt. was back to her baseline. Floor course c/b episode of respiratory distress, however BIPAP was not started given secretions and lethargy, oxygenation wnl with 5L NC. ABG was unremarkable for CO2 retention. Speech and swallow was consulted, not recommended for PEG tube. Palliative care was consulted however family requested transfer to [facility]. GI was consulted and PEG was tentatively scheduled for 1/24 AM if pt. was not transferred by that time.     On arrival to [facility] - pt. on heparin drip at 490 units/hr and D5 1/2 NS @50cc/hr. PTT prior to transfer noted to be 58. (23 Jan 2019 23:36)    PERTINENT PM/SXH:   H/O: Osteoarthritis  GERD (Gastroesophageal Reflux Disease)  ? Chronic CHF  Diabetes Mellitus  HTN (Hypertension), Benign  Diabetic Acidosis, Type II    History of Arthroscopy of Knee -  right  History of Arthroscopy  History of Total Knee  left in 2003    FAMILY HISTORY:  No pertinent family history in first degree relatives      SOCIAL HISTORY:   Significant other/partner: Yes [x ]  No [ ] Children:  Yes [x ] - 7 children Jewish/Spirituality: Uatsdin  Substance hx: Tobacco hx: Alcohol hx: Unable to assess   Home Opioid hx:  Yes [ ] No [x ]   Living Situation: [x ]Home  [ ]Long term care  [ ]Rehab [ ]Other    ADVANCE DIRECTIVES:    DNR  Yes    [ ] Health Care Proxy(s)  [x ] Surrogate(s)  [ ] Guardian           Name(s): Phone Number(s): Mark Quiroz - [phone] / Aneta Ham - [phone]    BASELINE (I)ADL(s) (prior to admission):  Rogers: [ ]Total  [ ] Moderate [x ]Dependent    Allergies    No Known Allergies    Intolerances    MEDICATIONS  (STANDING):  dextrose 5% + sodium chloride 0.45%. 1000 milliLiter(s) (50 mL/Hr) IV Continuous <Continuous>  dextrose 5%. 1000 milliLiter(s) (50 mL/Hr) IV Continuous <Continuous>  dextrose 50% Injectable 12.5 Gram(s) IV Push once  dextrose 50% Injectable 25 Gram(s) IV Push once  dextrose 50% Injectable 25 Gram(s) IV Push once  heparin  Infusion 700 Unit(s)/Hr (7 mL/Hr) IV Continuous <Continuous>  influenza   Vaccine 0.5 milliLiter(s) IntraMuscular once  insulin lispro (HumaLOG) corrective regimen sliding scale   SubCutaneous every 6 hours    MEDICATIONS  (PRN):  dextrose 40% Gel 15 Gram(s) Oral once PRN Blood Glucose LESS THAN 70 milliGRAM(s)/deciliter  glucagon  Injectable 1 milliGRAM(s) IntraMuscular once PRN Glucose LESS THAN 70 milligrams/deciliter    PRESENT SYMPTOMS: [x ]Unable to obtain due to poor mentation   Source if other than patient:  [ ]Family   [ ]Team     Pain (Impact on QOL):    Location -   Severity -        Minimal acceptable level (0-10 scale):  Quality:   Onset:   Duration:                 Aggravating factors -  Relieving factors -  Radiation -    PAIN AD Score: 0    [email or web address] (press ctrl +  left click to view)    Dyspnea:  Yes [ ] No [ ] - [ ]Mild [ ]Moderate [ ]Severe  Anxiety:    Yes [ ] No [ ] - [ ]Mild [ ]Moderate [ ]Severe  Fatigue:    Yes [ ] No [ ] - [ ]Mild [ ]Moderate [ ]Severe  Nausea:    Yes [ ] No [ ] - [ ]Mild [ ]Moderate [ ]Severe                         Loss of appetite: Yes [ ] No [ ] - [ ]Mild [ ]Moderate [ ]Severe             Constipation:  Yes [ ] No [ ] - [ ]Mild [ ]Moderate [ ]Severe    Other Symptoms:  [ ]All other review of systems negative     Karnofsky Performance Score/Palliative Performance Status Version 2:   10%    http://palliative.info/resource_material/[email or web address].pdf    PHYSICAL EXAM:  Vital Signs Last 24 Hrs  T(C): 36.4 (24 Jan 2019 05:10), Max: 36.4 (24 Jan 2019 05:10)  T(F): 97.6 (24 Jan 2019 05:10), Max: 97.6 (24 Jan 2019 05:10)  HR: 65 (24 Jan 2019 10:31) (58 - 76)  BP: 142/58 (24 Jan 2019 10:31) (127/80 - 169/75)  BP(mean): --  RR: 16 (24 Jan 2019 05:10) (16 - 16)  SpO2: 95% (24 Jan 2019 10:31) (85% - 100%) I&O's Summary    24 Jan 2019 07:01  -  24 Jan 2019 13:11  --------------------------------------------------------  IN: 396 mL / OUT: 0 mL / NET: 396 mL    GENERAL:  Alert, not following commands, non-verbal, bedbound   PULMONARY:   Clear right, coarse left   CARDIOVASCULAR:    [x ]Regular [ ]Irregular [ ]Tachy  [ ]Frandy [ ]Murmur [ ]Other  GASTROINTESTINAL:  [ x]Soft  [ ]Distended   [x ]+BS  [x]Non tender [ ]Tender  [ ]PEG [ ]OGT/ NGT  Last BM: Unable to assess   GENITOURINARY:  [ ]Normal [ ] Incontinent   [ ]Oliguria/Anuria   [x ]Juares  MUSCULOSKELETAL:   [ ]Normal   [ ]Weakness  [x ]Bed/Wheelchair bound [ ]Edema  NEUROLOGIC:   [ ]No focal deficits  [ x] Cognitive impairment  [ ] Dysphagia [ ]Dysarthria [ ] Paresis [ ]Other   SKIN:   [x ]Normal   [ ]Pressure ulcer(s)  [ ]Rash    LABS:                        11.7   7.14  )-----------( 52       ( 24 Jan 2019 05:10 )             39.3   01-24    137  |  113<H>  |  6<L>  ----------------------------<  214<H>  Test not performed SPECIMEN SEVERELY HEMOLYZED   |  14<L>  |  0.49<L>    Ca    9.7      24 Jan 2019 05:10  Phos  2.6     01-24  Mg     1.9     01-24    TPro  5.9<L>  /  Alb  1.9<L>  /  TBili  0.2  /  DBili  x   /  AST  52<H>  /  ALT  13  /  AlkPhos  65  01-23  PTT - ( 24 Jan 2019 09:38 )  PTT:45.2 SEC      RADIOLOGY & ADDITIONAL STUDIES:    PROTEIN CALORIE MALNUTRITION PRESENT: [ ] Yes [ ] No  [ ] PPSV2 < or = to 30% [ ] significant weight loss  [ ] poor nutritional intake [ ] catabolic state [ ] anasarca     Albumin, Serum: 1.9 g/dL (01-23-19 @ 22:55)      REFERRALS:   [ ]Chaplaincy  [ ] Hospice  [ ]Child Life  [ ]Social Work  [ ]Case management [ ]Holistic Therapy   Goals of Care Discussion Document:

## 2019-01-24 NOTE — CONSULT NOTE ADULT - PROBLEM SELECTOR RECOMMENDATION 2
- noted on imaging at [facility]   - cont heparin gtt  - gi ppx with protonix 40mg IVP QD given NPO   - monitor for gi bleeding with a/c   - hold heparin gtt at midnight in anticipation for EGD/PEG tomorrow, 1/25 - noted on imaging at [facility]   - cont heparin gtt  - gi ppx with protonix 40mg IVP QD given NPO   - monitor for gi bleeding with a/c

## 2019-01-24 NOTE — CONSULT NOTE ADULT - ASSESSMENT
88 year old with dementia recently admitted to osh with critical illness requiring ICU stay.  Tx for aspiration pneumonia and UTI    1) Aspiration pna: day 4 of treatment  resume abx with zosyn for 3 more days    Aspiration precautions    2) UTI:   Finish tx course with zosyn    3) Dementia: failure to thrive  Multiple comorbidities leaves pt at risk for complications including recurrent aspiration pna  Palliative eval in progress

## 2019-01-24 NOTE — CONSULT NOTE ADULT - PROBLEM SELECTOR RECOMMENDATION 2
Failed speech and swallow at [facility].  Spoke to patient's daughter Aneta via phone [phone] - Aneta states that the family does not want a feeding tube and would like her to eat orally.  Pleasure feeds and aspiration precautions discussed, Aneta aware of risks and would like her mom to be able to eat orally.  Of note patient with a  and no HCP from found in [facility] or available from family.  Patient's  contacted via phone ([phone]) and is aware that the goal is no feeding tube or aggressive resuscitation and defers decision making to Aneta at this time.  He states that he will be coming to the hospital and he has been involved in her care, and is trusting that Aneta can make decisions appropriately for his wife.  Currently, patient on 100% NRB and unable to be taken off due to hypoxia.  As per conversation with Aneta - goal will be to wean patient off 100% NRB and attempt to feed orally, knowing the risks.  She is aware that a PEG would not be recommended given her advanced dementia and it would likely not improve quality or quantity of life and it would not eliminate the risk of aspiration.  She states that the family is in agreement that a feeding tube would not be something that the patient of the family would want, as is consistent with documentation in a 2016 (available in [facility]) discussion with family on a prior admission.  Will readdress when patient's respiratory status is more stable. Failed speech and swallow at [facility].  Spoke to patient's daughter Cait via phone [phone] - Cait states that the family does not want a feeding tube and would like her to eat orally.  Pleasure feeds and aspiration precautions discussed, Cait aware of risks and would like her mom to be able to eat orally.  Of note patient with a  and no HCP from found in [facility] or available from family.  Patient's  contacted via phone ([phone]) and is aware that the goal is no feeding tube or aggressive resuscitation and defers decision making to Cait at this time.  He states that he will be coming to the hospital and he has been involved in her care, and is trusting that Cait can make decisions appropriately for his wife.  Currently, patient on 100% NRB and unable to be taken off due to hypoxia.  As per conversation with Cait - goal will be to wean patient off 100% NRB and attempt to feed orally, knowing the risks, and taking appropriate precautions.  She is aware that a PEG would not be recommended given her advanced dementia and it would likely not improve quality or quantity of life and it would not eliminate the risk of aspiration.  She states that the family is in agreement that a feeding tube would not be something that the patient or the family would want, as is consistent with documentation in a 2016 (available in [facility]) discussion with family on a prior admission.  Will readdress when patient's respiratory status is more stable. Failed speech and swallow at [facility].  Spoke to patient's daughter Cait via phone [phone] - Cait states that the family does not want a feeding tube and would like her to eat orally.  Pleasure feeds and aspiration precautions discussed, Cait aware of risks and would like her mom to be able to eat orally.  Of note patient with a  and no HCP form found in [facility] or available from family.  Patient's  contacted via phone ([phone]) and is aware that the goal is no feeding tube or aggressive resuscitation and defers decision making to Cait at this time.  He states that he will be coming to the hospital and he has been involved in her care, and is trusting that Cait can make decisions appropriately for his wife.  Currently, patient on 100% NRB and unable to be taken off due to hypoxia.  As per conversation with Cait - goal will be to wean patient off 100% NRB and attempt to feed orally, knowing the risks, and taking appropriate precautions.  She is aware that a PEG would not be recommended given her advanced dementia and it would likely not improve quality or quantity of life and it would not eliminate the risk of aspiration.  She states that the family is in agreement that a feeding tube would not be something that the patient or the family would want, as is consistent with documentation in a 2016 (available in [facility]) discussion with family on a prior admission.  Will readdress when patient's respiratory status is more stable.

## 2019-01-24 NOTE — CONSULT NOTE ADULT - PROBLEM SELECTOR RECOMMENDATION 3
Chest Xray pending.  Patient received 5 days of Ceftriaxone at [facility].  Continue management as per primary team.

## 2019-01-24 NOTE — CONSULT NOTE ADULT - REASON FOR ADMISSION
transfer from [facility]
transfer from [facility]
transfer from [facility]

## 2019-01-24 NOTE — CONSULT NOTE ADULT - PROBLEM SELECTOR RECOMMENDATION 3
- likely aspiration pneumonia in setting of dysphagia 2/2 advanced dementia   - strict aspiration precautions   - will defer abx coverage to primary team; unclear if pt adequately treated at outside hospital

## 2019-01-24 NOTE — CONSULT NOTE ADULT - ASSESSMENT
87yo female with h/o advanced dementia (at baseline, bedbound, nonverbal, can track and take PO feeds), HTN, HLD, DM, who presented to [facility] 1/19 with AMS, cough, subjective fevers, admitted to MICU on BIPAP, and started on CTX/azithromycin for sepsis 2/2 PNA and/or UTI. Workup positive for UCx + Proteus (sensitive to CTX) and right basilar opacity on CXR. Course c/b on LLE DVT now on heparin gtt (goal 50-60 due to high bleeding risk - hematuria) and hypernatremia to 150s, now resolved with IVF. Mental status improved and back to baseline. GI consulted for peg placement as pt failed speech and swallow at OSH with family wishing for alternative means of nutrition, 89yo female with h/o advanced dementia (at baseline, bedbound, nonverbal, can track and take PO feeds), HTN, HLD, DM, who presented to [facility] 1/19 with AMS, cough, subjective fevers, admitted to MICU on BIPAP, and started on CTX/azithromycin for sepsis 2/2 PNA and/or UTI. Workup positive for UCx + Proteus (sensitive to CTX) and right basilar opacity on CXR. Course c/b on LLE DVT now on heparin gtt (goal 50-60 due to high bleeding risk - hematuria) and hypernatremia to 150s, now resolved with IVF. Mental status improved and back to baseline. GI consulted for peg evaluation as pt failed speech and swallow at OSH

## 2019-01-24 NOTE — CONSULT NOTE ADULT - PROBLEM SELECTOR RECOMMENDATION 5
As per discussion via phone with Aneta (patient's  deferring to Aneta), patient DNR/DNI (also consistent with prior admission wishes - had previously been on hospice) - MOLST completed and available in chart.  Aneta's goal is to treat any infection with the hope that the patient will regain her ability to eat and be taken off oxygen.  Psychosocial support provided. As per discussion via phone with Cait (patient's  deferring to Cait), patient DNR/DNI (also consistent with prior admission wishes - had previously been on hospice) - MOLST completed and available in chart.  Cait's goal is to treat any infection with the hope that the patient will regain her ability to eat and be taken off oxygen.  Psychosocial support provided. 30 minutes at bedside  As per discussion with Cait (patient's  deferring to Cait), patient DNR/DNI (also consistent with prior admission wishes - had previously been on hospice) - MOLST completed and available in chart.  Cait's goal is to treat any infection with the hope that the patient will regain her ability to eat and be taken off oxygen.  Psychosocial support provided.

## 2019-01-25 LAB
ANION GAP SERPL CALC-SCNC: 10 MMO/L — SIGNIFICANT CHANGE UP (ref 7–14)
APTT BLD: 33.8 SEC — SIGNIFICANT CHANGE UP (ref 27.5–36.3)
APTT BLD: 48.4 SEC — HIGH (ref 27.5–36.3)
APTT BLD: 50.7 SEC — HIGH (ref 27.5–36.3)
APTT BLD: 70.9 SEC — HIGH (ref 27.5–36.3)
BUN SERPL-MCNC: 5 MG/DL — LOW (ref 7–23)
CALCIUM SERPL-MCNC: 10.2 MG/DL — SIGNIFICANT CHANGE UP (ref 8.4–10.5)
CHLORIDE SERPL-SCNC: 111 MMOL/L — HIGH (ref 98–107)
CO2 SERPL-SCNC: 20 MMOL/L — LOW (ref 22–31)
CREAT SERPL-MCNC: 0.58 MG/DL — SIGNIFICANT CHANGE UP (ref 0.5–1.3)
GLUCOSE BLDC GLUCOMTR-MCNC: 148 MG/DL — HIGH (ref 70–99)
GLUCOSE BLDC GLUCOMTR-MCNC: 150 MG/DL — HIGH (ref 70–99)
GLUCOSE BLDC GLUCOMTR-MCNC: 163 MG/DL — HIGH (ref 70–99)
GLUCOSE BLDC GLUCOMTR-MCNC: 168 MG/DL — HIGH (ref 70–99)
GLUCOSE SERPL-MCNC: 198 MG/DL — HIGH (ref 70–99)
HCT VFR BLD CALC: 37.3 % — SIGNIFICANT CHANGE UP (ref 34.5–45)
HCT VFR BLD CALC: 38.9 % — SIGNIFICANT CHANGE UP (ref 34.5–45)
HGB BLD-MCNC: 11.7 G/DL — SIGNIFICANT CHANGE UP (ref 11.5–15.5)
HGB BLD-MCNC: 11.7 G/DL — SIGNIFICANT CHANGE UP (ref 11.5–15.5)
HGB BLD-MCNC: 11.9 G/DL — SIGNIFICANT CHANGE UP (ref 11.5–15.5)
HGB BLD-MCNC: 12.3 G/DL — SIGNIFICANT CHANGE UP (ref 11.5–15.5)
MAGNESIUM SERPL-MCNC: 1.9 MG/DL — SIGNIFICANT CHANGE UP (ref 1.6–2.6)
MCHC RBC-ENTMCNC: 29.1 PG — SIGNIFICANT CHANGE UP (ref 27–34)
MCHC RBC-ENTMCNC: 29.4 PG — SIGNIFICANT CHANGE UP (ref 27–34)
MCHC RBC-ENTMCNC: 29.6 PG — SIGNIFICANT CHANGE UP (ref 27–34)
MCHC RBC-ENTMCNC: 30 PG — SIGNIFICANT CHANGE UP (ref 27–34)
MCHC RBC-ENTMCNC: 31.4 % — LOW (ref 32–36)
MCHC RBC-ENTMCNC: 31.4 % — LOW (ref 32–36)
MCHC RBC-ENTMCNC: 31.6 % — LOW (ref 32–36)
MCHC RBC-ENTMCNC: 31.9 % — LOW (ref 32–36)
MCV RBC AUTO: 92.8 FL — SIGNIFICANT CHANGE UP (ref 80–100)
MCV RBC AUTO: 93.7 FL — SIGNIFICANT CHANGE UP (ref 80–100)
MCV RBC AUTO: 93.7 FL — SIGNIFICANT CHANGE UP (ref 80–100)
MCV RBC AUTO: 94 FL — SIGNIFICANT CHANGE UP (ref 80–100)
NRBC # FLD: 0 K/UL — LOW (ref 25–125)
PHOSPHATE SERPL-MCNC: 2.1 MG/DL — LOW (ref 2.5–4.5)
PLATELET # BLD AUTO: 119 K/UL — LOW (ref 150–400)
PLATELET # BLD AUTO: 144 K/UL — LOW (ref 150–400)
PLATELET # BLD AUTO: 149 K/UL — LOW (ref 150–400)
PLATELET # BLD AUTO: 153 K/UL — SIGNIFICANT CHANGE UP (ref 150–400)
PMV BLD: 12.4 FL — SIGNIFICANT CHANGE UP (ref 7–13)
PMV BLD: 12.5 FL — SIGNIFICANT CHANGE UP (ref 7–13)
PMV BLD: 12.8 FL — SIGNIFICANT CHANGE UP (ref 7–13)
PMV BLD: 12.9 FL — SIGNIFICANT CHANGE UP (ref 7–13)
POTASSIUM SERPL-MCNC: 3.2 MMOL/L — LOW (ref 3.5–5.3)
POTASSIUM SERPL-SCNC: 3.2 MMOL/L — LOW (ref 3.5–5.3)
RBC # BLD: 3.97 M/UL — SIGNIFICANT CHANGE UP (ref 3.8–5.2)
RBC # BLD: 3.98 M/UL — SIGNIFICANT CHANGE UP (ref 3.8–5.2)
RBC # BLD: 4.02 M/UL — SIGNIFICANT CHANGE UP (ref 3.8–5.2)
RBC # BLD: 4.15 M/UL — SIGNIFICANT CHANGE UP (ref 3.8–5.2)
RBC # FLD: 13.8 % — SIGNIFICANT CHANGE UP (ref 10.3–14.5)
RBC # FLD: 13.9 % — SIGNIFICANT CHANGE UP (ref 10.3–14.5)
SODIUM SERPL-SCNC: 141 MMOL/L — SIGNIFICANT CHANGE UP (ref 135–145)
WBC # BLD: 8 K/UL — SIGNIFICANT CHANGE UP (ref 3.8–10.5)
WBC # BLD: 8.8 K/UL — SIGNIFICANT CHANGE UP (ref 3.8–10.5)
WBC # BLD: 8.95 K/UL — SIGNIFICANT CHANGE UP (ref 3.8–10.5)
WBC # BLD: 9.6 K/UL — SIGNIFICANT CHANGE UP (ref 3.8–10.5)
WBC # FLD AUTO: 8 K/UL — SIGNIFICANT CHANGE UP (ref 3.8–10.5)
WBC # FLD AUTO: 8.8 K/UL — SIGNIFICANT CHANGE UP (ref 3.8–10.5)
WBC # FLD AUTO: 8.95 K/UL — SIGNIFICANT CHANGE UP (ref 3.8–10.5)
WBC # FLD AUTO: 9.6 K/UL — SIGNIFICANT CHANGE UP (ref 3.8–10.5)

## 2019-01-25 PROCEDURE — 99232 SBSQ HOSP IP/OBS MODERATE 35: CPT

## 2019-01-25 RX ORDER — HEPARIN SODIUM 5000 [USP'U]/ML
7 INJECTION INTRAVENOUS; SUBCUTANEOUS
Qty: 25000 | Refills: 0 | Status: DISCONTINUED | OUTPATIENT
Start: 2019-01-25 | End: 2019-01-25

## 2019-01-25 RX ORDER — HEPARIN SODIUM 5000 [USP'U]/ML
600 INJECTION INTRAVENOUS; SUBCUTANEOUS
Qty: 25000 | Refills: 0 | Status: DISCONTINUED | OUTPATIENT
Start: 2019-01-25 | End: 2019-01-26

## 2019-01-25 RX ORDER — POTASSIUM PHOSPHATE, MONOBASIC POTASSIUM PHOSPHATE, DIBASIC 236; 224 MG/ML; MG/ML
15 INJECTION, SOLUTION INTRAVENOUS ONCE
Qty: 0 | Refills: 0 | Status: COMPLETED | OUTPATIENT
Start: 2019-01-25 | End: 2019-01-25

## 2019-01-25 RX ORDER — HEPARIN SODIUM 5000 [USP'U]/ML
700 INJECTION INTRAVENOUS; SUBCUTANEOUS
Qty: 25000 | Refills: 0 | Status: DISCONTINUED | OUTPATIENT
Start: 2019-01-25 | End: 2019-01-25

## 2019-01-25 RX ORDER — POTASSIUM CHLORIDE 20 MEQ
10 PACKET (EA) ORAL
Qty: 0 | Refills: 0 | Status: COMPLETED | OUTPATIENT
Start: 2019-01-25 | End: 2019-01-25

## 2019-01-25 RX ADMIN — PIPERACILLIN AND TAZOBACTAM 25 GRAM(S): 4; .5 INJECTION, POWDER, LYOPHILIZED, FOR SOLUTION INTRAVENOUS at 22:04

## 2019-01-25 RX ADMIN — HEPARIN SODIUM 7 UNIT(S)/HR: 5000 INJECTION INTRAVENOUS; SUBCUTANEOUS at 05:45

## 2019-01-25 RX ADMIN — Medication 1: at 12:16

## 2019-01-25 RX ADMIN — HEPARIN SODIUM 6 UNIT(S)/HR: 5000 INJECTION INTRAVENOUS; SUBCUTANEOUS at 16:15

## 2019-01-25 RX ADMIN — PIPERACILLIN AND TAZOBACTAM 25 GRAM(S): 4; .5 INJECTION, POWDER, LYOPHILIZED, FOR SOLUTION INTRAVENOUS at 05:45

## 2019-01-25 RX ADMIN — Medication 100 MILLIEQUIVALENT(S): at 13:35

## 2019-01-25 RX ADMIN — Medication 1: at 05:45

## 2019-01-25 RX ADMIN — POTASSIUM PHOSPHATE, MONOBASIC POTASSIUM PHOSPHATE, DIBASIC 62.5 MILLIMOLE(S): 236; 224 INJECTION, SOLUTION INTRAVENOUS at 18:08

## 2019-01-25 RX ADMIN — Medication 100 MILLIEQUIVALENT(S): at 11:20

## 2019-01-25 RX ADMIN — Medication 100 MILLIEQUIVALENT(S): at 10:08

## 2019-01-25 RX ADMIN — HEPARIN SODIUM 6 UNIT(S)/HR: 5000 INJECTION INTRAVENOUS; SUBCUTANEOUS at 10:08

## 2019-01-25 RX ADMIN — SODIUM CHLORIDE 20 MILLILITER(S): 9 INJECTION, SOLUTION INTRAVENOUS at 05:44

## 2019-01-25 RX ADMIN — PIPERACILLIN AND TAZOBACTAM 25 GRAM(S): 4; .5 INJECTION, POWDER, LYOPHILIZED, FOR SOLUTION INTRAVENOUS at 13:50

## 2019-01-25 NOTE — DIETITIAN INITIAL EVALUATION ADULT. - PROBLEM SELECTOR PLAN 4
- UCx at [facility] with Proteus, sensitive to ceftriaxone   - s/p ceftriaxone for 4 days (1/19 -1/22, transferred on 1/23)  - will give 1 dose of ceftriaxone now to complete 5 day course  - reilly in place from outside hospital, consider trial of void

## 2019-01-25 NOTE — DIETITIAN INITIAL EVALUATION ADULT. - PROBLEM SELECTOR PLAN 1
- at baseline pt. has advanced dementia (non-verbal, bedbound)  - presented with worsening lethargy of 3 days duration at [facility] in setting of sepsis from UTI vs. PNA (possible aspiration event)   - per outside hospital documentation, mental status close to baseline per family after few days of treatment  - management of UTI and PNA as below  - failed speech and swallow, was planned for PEG with GI 1/24 had pt. not been transferred, discuss with family in morning and reconsult GI if goals remain the same  - fall and aspiration precautions

## 2019-01-25 NOTE — CHART NOTE - NSCHARTNOTEFT_GEN_A_CORE
As per RN pt with blood tinged urine in reilly bag. VSS. CBC unchanged. Discussed PTT (70.9) with Pharmacist Dianne. Advised to hold hep gtt for now and recheck PTT in 4 hrs and dose accordingly (as pt is on pt specific hep gtt with ptt goal 50-60). As per RN pt with blood tinged urine in reilly bag. VSS. CBC unchanged. Based upon PTT (70.9), will resume gtt at lower rate (6 mL/hr). Discussed with attg. Will monitor.

## 2019-01-25 NOTE — DIETITIAN INITIAL EVALUATION ADULT. - OTHER INFO
Nutrition consult received for significant decrease in oral intake. Met with patient and daughter at bedside. Patient NPO at this time. Recently failed speech and swallow at [facility] from where she was transferred to [facility] per chart review. Palliative care following. Daughter voiced wants to feed patient but also aware of aspiration precaution. As per MD note 1/25/19, family do not wish for PEG placement at this time and pleasure feed discussed. Of note, daughter states patient's usual body weight to be 145lbs (last summer 2018) and notes has been losing weight. Patient now weighs 125.6lbs (1/23/19) this admission.

## 2019-01-25 NOTE — DIETITIAN INITIAL EVALUATION ADULT. - PROBLEM SELECTOR PLAN 3
- p/w cough and subjective fevers, CXR 1/22 with increased right basilar opacity   - likely aspiration PNA, as pt. was coughing with soft foods at home  - pt. completed 4 days of ceftriaxone and azithromycin (1/19 -1/22, transferred on 1/23)  - will give 1 dose of CTX to complete 5 day duration for CAP, however pt. not adequately treated for aspiration PNA given no anaerobic coverage with CTX/Azithromycin  - if pt. develops fever, start zosyn for aspiration PNA

## 2019-01-25 NOTE — PROGRESS NOTE ADULT - PROBLEM SELECTOR PLAN 9
detailed discussed with patient in detail, all questions answered Aneta (Mary Grace) yesterday and another daughter at bedside today  no aggressive measures detailed discussed with patient in detail, all questions answered Aneta (Mary Grace) yesterday and another daughter at bedside today  no aggressive measures  I spoke to hospice RN they will see patient  likely home vs inpatient hospice early next week

## 2019-01-25 NOTE — DIETITIAN INITIAL EVALUATION ADULT. - PROBLEM SELECTOR PLAN 2
- acute LLE DVT at outside hospital   - started on heparin drip with patient specific normogram, goal PTT 50-60 given high risk of bleeding (hematuria)  - heparin drip current at 490 units/hr from outside hospital, PTT at outside hospital prior to transfer 58, will recheck and monitor PTT [id number]

## 2019-01-25 NOTE — DIETITIAN INITIAL EVALUATION ADULT. - PERTINENT MEDS FT
MEDICATIONS  (STANDING):  dextrose 5% + sodium chloride 0.45%. 1000 milliLiter(s) (20 mL/Hr) IV Continuous <Continuous>  dextrose 5%. 1000 milliLiter(s) (50 mL/Hr) IV Continuous <Continuous>  dextrose 50% Injectable 12.5 Gram(s) IV Push once  dextrose 50% Injectable 25 Gram(s) IV Push once  dextrose 50% Injectable 25 Gram(s) IV Push once  heparin  Infusion 600 Unit(s)/Hr (6 mL/Hr) IV Continuous <Continuous>  influenza   Vaccine 0.5 milliLiter(s) IntraMuscular once  insulin lispro (HumaLOG) corrective regimen sliding scale   SubCutaneous every 6 hours  piperacillin/tazobactam IVPB. 3.375 Gram(s) IV Intermittent every 8 hours  potassium phosphate IVPB 15 milliMole(s) IV Intermittent once    MEDICATIONS  (PRN):  dextrose 40% Gel 15 Gram(s) Oral once PRN Blood Glucose LESS THAN 70 milliGRAM(s)/deciliter  glucagon  Injectable 1 milliGRAM(s) IntraMuscular once PRN Glucose LESS THAN 70 milligrams/deciliter

## 2019-01-25 NOTE — DIETITIAN INITIAL EVALUATION ADULT. - PERTINENT LABORATORY DATA
01-25 Na141 mmol/L Glu 198 mg/dL<H> K+ 3.2 mmol/L<L> Cr  0.58 mg/dL BUN 5 mg/dL<L> 01-25 Phos 2.1 mg/dL<L> 01-23 Alb 1.9 g/dL<L>

## 2019-01-25 NOTE — DIETITIAN INITIAL EVALUATION ADULT. - ORAL INTAKE PTA
Per daughter, patient usually with good appetite and po intake, but started to decline over last 2 weeks PTA. Her diet usually consists to Pureed foods which would be prepared by her daughter.

## 2019-01-25 NOTE — PROGRESS NOTE ADULT - ATTENDING COMMENTS
discussed with daughter at bedside in detail discussed with daughter at bedside in detail  replete K IV

## 2019-01-25 NOTE — DIETITIAN INITIAL EVALUATION ADULT. - PROBLEM SELECTOR PLAN 6
- controlled  - unclear home medication list  - pt. NPO, will administer IV antihypertensives as needed  - vital signs per routine

## 2019-01-25 NOTE — DIETITIAN INITIAL EVALUATION ADULT. - PROBLEM SELECTOR PLAN 5
- at [facility], initially hypernatremic to 150s, started on D5W, later transitioned to D5 1/2 NS, 1/23 7AM Na 143 prior to transfer, [facility] admission labs with Na 138  - likely 2/2 no PO intake, failed speech and swallow, NGT placement at OSH unsuccessful after multiple attempts  - will c/w D5 1/2 NS at 50cc/hour for now  - trend daily BMP

## 2019-01-25 NOTE — DIETITIAN INITIAL EVALUATION ADULT. - PROBLEM SELECTOR PLAN 8
- transfer paperwork without list of home medications   - unable to reach family at home, discuss in morning regarding home medication list, and resume IV equivalents as indicated as pt. NPO currently without NG tube

## 2019-01-26 LAB
ANION GAP SERPL CALC-SCNC: 11 MMO/L — SIGNIFICANT CHANGE UP (ref 7–14)
APTT BLD: 63.5 SEC — HIGH (ref 27.5–36.3)
APTT BLD: 70.6 SEC — HIGH (ref 27.5–36.3)
APTT BLD: 79.8 SEC — HIGH (ref 27.5–36.3)
BUN SERPL-MCNC: 6 MG/DL — LOW (ref 7–23)
CALCIUM SERPL-MCNC: 9.9 MG/DL — SIGNIFICANT CHANGE UP (ref 8.4–10.5)
CHLORIDE SERPL-SCNC: 111 MMOL/L — HIGH (ref 98–107)
CO2 SERPL-SCNC: 16 MMOL/L — LOW (ref 22–31)
CREAT SERPL-MCNC: 0.64 MG/DL — SIGNIFICANT CHANGE UP (ref 0.5–1.3)
GLUCOSE BLDC GLUCOMTR-MCNC: 142 MG/DL — HIGH (ref 70–99)
GLUCOSE BLDC GLUCOMTR-MCNC: 147 MG/DL — HIGH (ref 70–99)
GLUCOSE BLDC GLUCOMTR-MCNC: 168 MG/DL — HIGH (ref 70–99)
GLUCOSE SERPL-MCNC: 161 MG/DL — HIGH (ref 70–99)
HCT VFR BLD CALC: 36.7 % — SIGNIFICANT CHANGE UP (ref 34.5–45)
HGB BLD-MCNC: 11.2 G/DL — LOW (ref 11.5–15.5)
MAGNESIUM SERPL-MCNC: 2 MG/DL — SIGNIFICANT CHANGE UP (ref 1.6–2.6)
MCHC RBC-ENTMCNC: 30.2 PG — SIGNIFICANT CHANGE UP (ref 27–34)
MCHC RBC-ENTMCNC: 30.5 % — LOW (ref 32–36)
MCV RBC AUTO: 98.9 FL — SIGNIFICANT CHANGE UP (ref 80–100)
NRBC # FLD: 0 K/UL — LOW (ref 25–125)
PHOSPHATE SERPL-MCNC: 3.2 MG/DL — SIGNIFICANT CHANGE UP (ref 2.5–4.5)
PLATELET # BLD AUTO: 141 K/UL — LOW (ref 150–400)
PMV BLD: 12.3 FL — SIGNIFICANT CHANGE UP (ref 7–13)
POTASSIUM SERPL-MCNC: 4.1 MMOL/L — SIGNIFICANT CHANGE UP (ref 3.5–5.3)
POTASSIUM SERPL-SCNC: 4.1 MMOL/L — SIGNIFICANT CHANGE UP (ref 3.5–5.3)
RBC # BLD: 3.71 M/UL — LOW (ref 3.8–5.2)
RBC # FLD: 14.2 % — SIGNIFICANT CHANGE UP (ref 10.3–14.5)
SODIUM SERPL-SCNC: 138 MMOL/L — SIGNIFICANT CHANGE UP (ref 135–145)
WBC # BLD: 7.14 K/UL — SIGNIFICANT CHANGE UP (ref 3.8–10.5)
WBC # FLD AUTO: 7.14 K/UL — SIGNIFICANT CHANGE UP (ref 3.8–10.5)

## 2019-01-26 RX ORDER — HEPARIN SODIUM 5000 [USP'U]/ML
500 INJECTION INTRAVENOUS; SUBCUTANEOUS
Qty: 25000 | Refills: 0 | Status: DISCONTINUED | OUTPATIENT
Start: 2019-01-26 | End: 2019-01-27

## 2019-01-26 RX ORDER — HEPARIN SODIUM 5000 [USP'U]/ML
600 INJECTION INTRAVENOUS; SUBCUTANEOUS
Qty: 25000 | Refills: 0 | Status: DISCONTINUED | OUTPATIENT
Start: 2019-01-26 | End: 2019-01-26

## 2019-01-26 RX ORDER — HEPARIN SODIUM 5000 [USP'U]/ML
650 INJECTION INTRAVENOUS; SUBCUTANEOUS
Qty: 25000 | Refills: 0 | Status: DISCONTINUED | OUTPATIENT
Start: 2019-01-26 | End: 2019-01-26

## 2019-01-26 RX ADMIN — PIPERACILLIN AND TAZOBACTAM 25 GRAM(S): 4; .5 INJECTION, POWDER, LYOPHILIZED, FOR SOLUTION INTRAVENOUS at 06:40

## 2019-01-26 RX ADMIN — HEPARIN SODIUM 5 UNIT(S)/HR: 5000 INJECTION INTRAVENOUS; SUBCUTANEOUS at 21:59

## 2019-01-26 RX ADMIN — PIPERACILLIN AND TAZOBACTAM 25 GRAM(S): 4; .5 INJECTION, POWDER, LYOPHILIZED, FOR SOLUTION INTRAVENOUS at 14:00

## 2019-01-26 RX ADMIN — HEPARIN SODIUM 6.5 UNIT(S)/HR: 5000 INJECTION INTRAVENOUS; SUBCUTANEOUS at 06:40

## 2019-01-26 RX ADMIN — Medication 1: at 12:26

## 2019-01-26 RX ADMIN — HEPARIN SODIUM 6 UNIT(S)/HR: 5000 INJECTION INTRAVENOUS; SUBCUTANEOUS at 15:39

## 2019-01-27 LAB
APTT BLD: 160.2 SEC — CRITICAL HIGH (ref 27.5–36.3)
APTT BLD: 37.7 SEC — HIGH (ref 27.5–36.3)
APTT BLD: 53.8 SEC — HIGH (ref 27.5–36.3)

## 2019-01-27 RX ORDER — HEPARIN SODIUM 5000 [USP'U]/ML
550 INJECTION INTRAVENOUS; SUBCUTANEOUS
Qty: 25000 | Refills: 0 | Status: DISCONTINUED | OUTPATIENT
Start: 2019-01-27 | End: 2019-01-27

## 2019-01-27 RX ORDER — HEPARIN SODIUM 5000 [USP'U]/ML
6 INJECTION INTRAVENOUS; SUBCUTANEOUS
Qty: 25000 | Refills: 0 | Status: DISCONTINUED | OUTPATIENT
Start: 2019-01-27 | End: 2019-01-27

## 2019-01-27 RX ORDER — HEPARIN SODIUM 5000 [USP'U]/ML
500 INJECTION INTRAVENOUS; SUBCUTANEOUS
Qty: 25000 | Refills: 0 | Status: DISCONTINUED | OUTPATIENT
Start: 2019-01-27 | End: 2019-01-28

## 2019-01-27 RX ORDER — HEPARIN SODIUM 5000 [USP'U]/ML
600 INJECTION INTRAVENOUS; SUBCUTANEOUS
Qty: 25000 | Refills: 0 | Status: DISCONTINUED | OUTPATIENT
Start: 2019-01-27 | End: 2019-01-27

## 2019-01-27 RX ADMIN — PIPERACILLIN AND TAZOBACTAM 25 GRAM(S): 4; .5 INJECTION, POWDER, LYOPHILIZED, FOR SOLUTION INTRAVENOUS at 08:06

## 2019-01-27 RX ADMIN — PIPERACILLIN AND TAZOBACTAM 25 GRAM(S): 4; .5 INJECTION, POWDER, LYOPHILIZED, FOR SOLUTION INTRAVENOUS at 00:23

## 2019-01-27 RX ADMIN — HEPARIN SODIUM 6 UNIT(S)/HR: 5000 INJECTION INTRAVENOUS; SUBCUTANEOUS at 20:36

## 2019-01-27 RX ADMIN — HEPARIN SODIUM 5 UNIT(S)/HR: 5000 INJECTION INTRAVENOUS; SUBCUTANEOUS at 22:35

## 2019-01-27 RX ADMIN — HEPARIN SODIUM 6 UNIT(S)/HR: 5000 INJECTION INTRAVENOUS; SUBCUTANEOUS at 11:07

## 2019-01-27 RX ADMIN — PIPERACILLIN AND TAZOBACTAM 25 GRAM(S): 4; .5 INJECTION, POWDER, LYOPHILIZED, FOR SOLUTION INTRAVENOUS at 18:31

## 2019-01-27 NOTE — PROVIDER CONTACT NOTE (OTHER) - ACTION/TREATMENT ORDERED:
Lower dose to 6.5gtt/hr.
Increase to 7 will put in orders.
Will discuss with attending
heparin drip stop for one hour. new rate ordered.

## 2019-01-28 ENCOUNTER — TRANSCRIPTION ENCOUNTER (OUTPATIENT)
Age: 84
End: 2019-01-28

## 2019-01-28 LAB
APTT BLD: 55 SEC — HIGH (ref 27.5–36.3)
APTT BLD: 55.1 SEC — HIGH (ref 27.5–36.3)
HCT VFR BLD CALC: 34.1 % — LOW (ref 34.5–45)
HGB BLD-MCNC: 10.9 G/DL — LOW (ref 11.5–15.5)
MCHC RBC-ENTMCNC: 30.1 PG — SIGNIFICANT CHANGE UP (ref 27–34)
MCHC RBC-ENTMCNC: 32 % — SIGNIFICANT CHANGE UP (ref 32–36)
MCV RBC AUTO: 94.2 FL — SIGNIFICANT CHANGE UP (ref 80–100)
NRBC # FLD: 0 K/UL — LOW (ref 25–125)
PLATELET # BLD AUTO: 173 K/UL — SIGNIFICANT CHANGE UP (ref 150–400)
PMV BLD: 11.6 FL — SIGNIFICANT CHANGE UP (ref 7–13)
RBC # BLD: 3.62 M/UL — LOW (ref 3.8–5.2)
RBC # FLD: 14.1 % — SIGNIFICANT CHANGE UP (ref 10.3–14.5)
WBC # BLD: 7.34 K/UL — SIGNIFICANT CHANGE UP (ref 3.8–10.5)
WBC # FLD AUTO: 7.34 K/UL — SIGNIFICANT CHANGE UP (ref 3.8–10.5)

## 2019-01-28 RX ADMIN — HEPARIN SODIUM 5 UNIT(S)/HR: 5000 INJECTION INTRAVENOUS; SUBCUTANEOUS at 11:22

## 2019-01-28 NOTE — CHART NOTE - NSCHARTNOTEFT_GEN_A_CORE
Family reached decision for home hospice. Discussed with Dr. Angel, will d/c heparin gtt at this point. Pt failed swallow eval, risk for aspiration, no plans for peg, unable to take Po. Administration of Lovenox injection will not be aligned with goal of care.

## 2019-01-28 NOTE — DISCHARGE NOTE ADULT - SECONDARY DIAGNOSIS.
Acute deep vein thrombosis (DVT) of left lower extremity, unspecified vein Pneumonia UTI (urinary tract infection) Hypernatremia Diabetes Mellitus Goals of care, counseling/discussion

## 2019-01-28 NOTE — DISCHARGE NOTE ADULT - PLAN OF CARE
safety precaution follow up with [facility] no anticoagulation, risk greater than benefit you completed antibiotics resolved stable, not currently on any anti-hyperglycemic medications

## 2019-01-28 NOTE — DISCHARGE NOTE ADULT - CARE PLAN
Principal Discharge DX:	Encephalopathy  Goal:	safety precaution  Assessment and plan of treatment:	follow up with [facility]  Secondary Diagnosis:	Acute deep vein thrombosis (DVT) of left lower extremity, unspecified vein  Assessment and plan of treatment:	no anticoagulation, risk greater than benefit  Secondary Diagnosis:	Pneumonia  Assessment and plan of treatment:	you completed antibiotics  Secondary Diagnosis:	UTI (urinary tract infection)  Assessment and plan of treatment:	you completed antibiotics  Secondary Diagnosis:	Hypernatremia  Goal:	resolved  Secondary Diagnosis:	Diabetes Mellitus  Secondary Diagnosis:	Goals of care, counseling/discussion  Assessment and plan of treatment:	follow up with [facility] Principal Discharge DX:	Encephalopathy  Goal:	safety precaution  Assessment and plan of treatment:	follow up with [facility]  Secondary Diagnosis:	Acute deep vein thrombosis (DVT) of left lower extremity, unspecified vein  Assessment and plan of treatment:	no anticoagulation, risk greater than benefit  Secondary Diagnosis:	Pneumonia  Assessment and plan of treatment:	you completed antibiotics  Secondary Diagnosis:	UTI (urinary tract infection)  Assessment and plan of treatment:	you completed antibiotics  Secondary Diagnosis:	Hypernatremia  Goal:	resolved  Secondary Diagnosis:	Diabetes Mellitus  Assessment and plan of treatment:	stable, not currently on any anti-hyperglycemic medications  Secondary Diagnosis:	Goals of care, counseling/discussion  Assessment and plan of treatment:	follow up with [facility]

## 2019-01-28 NOTE — DISCHARGE NOTE ADULT - PATIENT PORTAL LINK FT
You can access the [facility], offered by [facility], by registering with the following website: [email or web address]

## 2019-01-28 NOTE — DISCHARGE NOTE ADULT - MEDICATION SUMMARY - MEDICATIONS TO STOP TAKING
I will STOP taking the medications listed below when I get home from the hospital:    metoprolol tartrate 25 mg oral tablet  -- 1 tab(s) by mouth 2 times a day    omeprazole 40 mg oral delayed release capsule  -- 1 cap(s) by mouth once a day  -- It is very important that you take or use this exactly as directed.  Do not skip doses or discontinue unless directed by your doctor.  Obtain medical advice before taking any non-prescription drugs as some may affect the action of this medication.  Swallow whole.  Do not crush.

## 2019-01-28 NOTE — DISCHARGE NOTE ADULT - HOSPITAL COURSE
88F hx of advanced dementia (at baseline, bedbound, nonverbal, can track and take PO feeds), HTN, HLD, DM, who presented to [facility] 1/19 with AMS, cough, subjective fevers, admitted to MICU on BIPAP, and started on CTX/azithromycin for sepsis 2/2 PNA and/or UTI. Workup positive for UCx + Proteus (sensitive to CTX) and right basilar opacity on CXR. Course c/b on LLE DVT, on patient specific heparin gtt (goal 50-60 due to high bleeding risk - hematuria) and hypernatremia to 150s, now resolved with IVF. Mental status improved, however patient failed speech and swallow, with GI planning tentative PEG tube placement at OSH on 1/24. Palliative was consulted, however family requested transfer to [facility]. Pt. transferred to [facility] on 1/24 for further management. Palliative consulted here at [facility], Pt s/p Zosyn x 4 days for Pneumonia, family opted for no peg placement, DNI/DNR status.  Referral made to hospice, family elected for home hospice. 88F hx of advanced dementia (at baseline, bedbound, nonverbal, can track and take PO feeds), HTN, HLD, DM, who presented to [facility] 1/19 with AMS, cough, subjective fevers, admitted to MICU on BIPAP, and started on CTX/azithromycin for sepsis 2/2 PNA and/or UTI. Workup positive for UCx + Proteus (sensitive to CTX) and right basilar opacity on CXR. Course c/b on LLE DVT, on patient specific heparin gtt (goal 50-60 due to high bleeding risk - hematuria) and hypernatremia to 150s, now resolved with IVF. Mental status improved, however patient failed speech and swallow, with GI planning tentative PEG tube placement at OSH on 1/24. Palliative was consulted, however family requested transfer to [facility]. Pt. transferred to [facility] on 1/24 for further management. Palliative consulted here at [facility], Pt s/p Zosyn x 4 days for Pneumonia, family opted for no peg placement, DNI/DNR status.  Referral made to hospice, family elected for home hospice. Per dr serrano patient medically stable for discharge with home hospice

## 2019-01-29 NOTE — PROGRESS NOTE ADULT - PROBLEM SELECTOR PROBLEM 8
Goals of care, counseling/discussion
Medication management

## 2019-01-29 NOTE — PROGRESS NOTE ADULT - PROBLEM SELECTOR PLAN 7
would discontinue FS  frequent needle sticks not consistent with comfort care
would likely discontinue FS if they stay < 200 range  frequent needle sticks not consistent with comfort care

## 2019-01-29 NOTE — PROGRESS NOTE ADULT - PROBLEM SELECTOR PLAN 8
detailed discussed with patient in detail, all questions answered Aneta (Mary Grace) yesterday and another daughter at bedside today  no aggressive measures  I spoke to hospice RN they will see patient  likely home vs inpatient hospice early next week
- transfer paperwork without list of home medications   - unable to reach family at home, discuss in morning regarding home medication list, and resume IV equivalents as indicated as pt. NPO currently without NG tube
no aggressive measures  likely home vs inpatient hospice early this week

## 2019-01-30 VITALS
RESPIRATION RATE: 18 BRPM | TEMPERATURE: 98 F | HEART RATE: 91 BPM | OXYGEN SATURATION: 97 % | DIASTOLIC BLOOD PRESSURE: 77 MMHG | SYSTOLIC BLOOD PRESSURE: 152 MMHG

## 2019-01-30 RX ORDER — AMLODIPINE BESYLATE 2.5 MG/1
1 TABLET ORAL
Qty: 0 | Refills: 0 | COMMUNITY

## 2019-01-30 NOTE — PROGRESS NOTE ADULT - PROVIDER SPECIALTY LIST ADULT
Gastroenterology
Infectious Disease
Internal Medicine
Gastroenterology
Internal Medicine
Internal Medicine

## 2019-01-30 NOTE — PROGRESS NOTE ADULT - PROBLEM SELECTOR PROBLEM 1
Encephalopathy
Dysphagia
Encephalopathy
Dysphagia
Encephalopathy

## 2019-01-30 NOTE — PROGRESS NOTE ADULT - PROBLEM SELECTOR PLAN 5
Na normal now  resolved

## 2019-01-30 NOTE — PROGRESS NOTE ADULT - PROBLEM SELECTOR PLAN 1
at baseline
- in setting of advanced dementia   - strict aspiration precautions   - failed swallow eval at [facility]   - as per discussions with patients family, they do not wish for PEG placement at this time and favor more conservative management   - start pleasure feeds as discussed with daughter
- in setting of advanced dementia   - strict aspiration precautions   - failed swallow eval at [facility]   - as per discussions with patients family, they do not wish for PEG placement at this time and favor more conservative management   - NPO, consider pleasure feeds  - hospice planning
- in setting of advanced dementia   - strict aspiration precautions   - failed swallow eval at [facility]   - as per discussions with patients family, they do not wish for PEG placement at this time and favor more conservative management   - NPO, consider pleasure feeds  - hospice planning in progress
likely secondary to superimposed pneumonia   will continue to monitor  supportive care
now at baseline per family though I suspect she is probably more responsive than this  likely secondary to superimposed pneumonia   will continue to monitor  supportive care
resolved  at baseline
resolved  at baseline
- in setting of advanced dementia   - strict aspiration precautions   - failed swallow eval at [facility]   - as per discussions with patients family, they do not wish for PEG placement at this time and favor more conservative management   - consider pleasure feeds  - hospice planning
likely secondary to superimposed pneumonia   will continue to monitor  supportive care

## 2019-01-30 NOTE — GOALS OF CARE CONVERSATION - PERSONAL ADVANCE DIRECTIVE - NAME OF PERSON WHO ASSISTED
pt has dementia, unable to assist with family filling out form

## 2019-01-30 NOTE — PROGRESS NOTE ADULT - PROBLEM SELECTOR PLAN 6
mildly uncontrolled  continue to monitor for now  no aggressive intervention required at this time
no aggressive intervention required at this time

## 2019-01-30 NOTE — PROGRESS NOTE ADULT - PROBLEM SELECTOR PROBLEM 4
UTI (urinary tract infection)

## 2019-01-30 NOTE — PROGRESS NOTE ADULT - PROBLEM SELECTOR PROBLEM 2
Acute deep vein thrombosis (DVT) of left lower extremity, unspecified vein

## 2019-01-30 NOTE — PROGRESS NOTE ADULT - PROBLEM SELECTOR PLAN 3
- likely aspiration pneumonia in setting of dysphagia 2/2 advanced dementia   - strict aspiration precautions  - s/p abx
HCAP diagnosed on CXR   Zosyn IV thru tomorrow   ID help appreciated
- likely aspiration pneumonia in setting of dysphagia 2/2 advanced dementia   - strict aspiration precautions  - abx to finish 1/27 per ID
- likely aspiration pneumonia in setting of dysphagia 2/2 advanced dementia   - strict aspiration precautions  - s/p abx
- likely aspiration pneumonia in setting of dysphagia 2/2 advanced dementia   - strict aspiration precautions  - s/p abx
HCAP diagnosed on CXR yesterday  will continue antibiotics per ID  Zosyn IV  ID help appreciated
Zosyn IV thru today  ID help appreciated  discontinue antibiotics after todays doses
discontinue antibiotics   ID help appreciated
discontinue antibiotics   ID help appreciated
finished antibiotics   ID help appreciated

## 2019-01-30 NOTE — PROGRESS NOTE ADULT - REASON FOR ADMISSION
transfer from [facility]
transfer from [facility]
transfer from [facility]
transfer from [facility]
transfer from [facility]
transfer from [facility]
transfer from [facility]
transfer from [facility]
transfer from [facility]
transfer from [facility]
transfer from [facility]

## 2019-01-30 NOTE — PROGRESS NOTE ADULT - PROBLEM SELECTOR PLAN 2
no treatment   discussed with patient's family at bedside in detail
- noted on imaging at [facility]   - cont heparin gtt  - gi ppx with protonix qd
- noted on imaging at [facility]   - heparin gtt discontinued as hospice planning
- noted on imaging at [facility]   - cont heparin gtt  - gi ppx with protonix qd  - hospice planning
discontinue IV heparin as constitutes futile treatment   discussed with patient's family at bedside in detail
low dose heparin drip to continue  if Hb drops or hematuria worsens will discontinue IV heparin and maintain just prophylaxis dose heparin 5000 BID
low dose heparin drip to continue  will likely discontinue heparin if family decides home hospice as would constitute futile treatment   will await hospice evaluation completion tomorrow
off IV heparin  discussed with patient's family at bedside in detail
- noted on imaging at [facility]   - cont heparin gtt  - gi ppx with protonix qd  - hospice planning
low dose heparin drip to continue  if Hb drops or hematuria worsens will discontinue IV heparin

## 2019-01-30 NOTE — PROGRESS NOTE ADULT - ASSESSMENT
88F hx of advanced dementia (at baseline, bedbound, nonverbal, can track and take PO feeds), HTN, HLD, DM, who presented to [facility] 1/19 with AMS, cough, subjective fevers, admitted to MICU on BIPAP, and started on CTX/azithromycin for sepsis 2/2 PNA and/or UTI. Workup positive for UCx + Proteus (sensitive to CTX) and right basilar opacity on CXR. Course c/b on LLE DVT, on patient specific heparin gtt (goal 50-60 due to high bleeding risk - hematuria) and hypernatremia to 150s, now resolved with IVF. Mental status improved, however patient failed speech and swallow, with GI planning tentative PEG tube placement at OSH on 1/24. Palliative was consulted, however family requested transfer to [facility]. Pt. transferred to [facility] on 1/24 for further management.
87yo female with h/o advanced dementia (at baseline, bedbound, nonverbal, can track and take PO feeds), HTN, HLD, DM, who presented to [facility] 1/19 with AMS, cough, subjective fevers, admitted to MICU on BIPAP, and started on CTX/azithromycin for sepsis 2/2 PNA and/or UTI. Workup positive for UCx + Proteus (sensitive to CTX) and right basilar opacity on CXR. Course c/b on LLE DVT now on heparin gtt (goal 50-60 due to high bleeding risk - hematuria) and hypernatremia to 150s, now resolved with IVF. Mental status improved and back to baseline. GI consulted for peg evaluation as pt failed speech and swallow at OSH
87yo female with h/o advanced dementia (at baseline, bedbound, nonverbal, can track and take PO feeds), HTN, HLD, DM, who presented to [facility] 1/19 with AMS, cough, subjective fevers, admitted to MICU on BIPAP, and started on CTX/azithromycin for sepsis 2/2 PNA and/or UTI. Workup positive for UCx + Proteus (sensitive to CTX) and right basilar opacity on CXR. Course c/b on LLE DVT now on heparin gtt (goal 50-60 due to high bleeding risk - hematuria) and hypernatremia to 150s, now resolved with IVF. Mental status improved and back to baseline. GI consulted for peg evaluation as pt failed speech and swallow at OSH
88 year old with dementia recently admitted to osh with critical illness requiring ICU stay.  Tx for aspiration pneumonia and UTI    1) Aspiration pna: day 5 of treatment  Continue zosyn through 1.27    Aspiration precautions    2) UTI:   Finish tx course with zosyn on 1/27    3) Dementia: failure to thrive  Multiple comorbidities leaves pt at risk for complications including recurrent aspiration pna  Palliative eval in progress    Call ID service with additional questions
88F hx of advanced dementia (at baseline, bedbound, nonverbal, can track and take PO feeds), HTN, HLD, DM, who presented to [facility] 1/19 with AMS, cough, subjective fevers, admitted to MICU on BIPAP, and started on CTX/azithromycin for sepsis 2/2 PNA and/or UTI. Workup positive for UCx + Proteus (sensitive to CTX) and right basilar opacity on CXR. Course c/b on LLE DVT, on patient specific heparin gtt (goal 50-60 due to high bleeding risk - hematuria) and hypernatremia to 150s, now resolved with IVF. Mental status improved, however patient failed speech and swallow, with GI planning tentative PEG tube placement at OSH on 1/24. Palliative was consulted, however family requested transfer to [facility]. Pt. transferred to [facility] on 1/24 for further management.
88F hx of advanced dementia (at baseline, bedbound, nonverbal, can track and take PO feeds), HTN, HLD, DM, who presented to [facility] 1/19 with AMS, cough, subjective fevers, admitted to MICU on BIPAP, and started on CTX/azithromycin for sepsis 2/2 PNA and/or UTI. Workup positive for UCx + Proteus (sensitive to CTX) and right basilar opacity on CXR. Course c/b on LLE DVT, on patient specific heparin gtt (goal 50-60 due to high bleeding risk - hematuria) and hypernatremia to 150s, now resolved with IVF. Mental status improved, however patient failed speech and swallow, with GI planning tentative PEG tube placement at OSH on 1/24. Palliative was consulted, however family requested transfer to [facility]. Pt. transferred to [facility] on 1/24 for further management.
88F hx of advanced dementia (at baseline, bedbound, nonverbal, can track and take PO feeds), HTN, HLD, DM, who presented to [facility] 1/19 with AMS, cough, subjective fevers, admitted to MICU on BIPAP, and started on CTX/azithromycin for sepsis 2/2 PNA and/or UTI. Workup positive for UCx + Proteus (sensitive to CTX) and right basilar opacity on CXR. Course c/b on LLE DVT, on patient specific heparin gtt (goal 50-60 due to high bleeding risk - hematuria) and hypernatremia to 150s, now resolved with IVF. Mental status improved, however patient failed speech and swallow, with GI planning tentative PEG tube placement at OSH on 1/24. Palliative was consulted, however family requested transfer to [facility]. Pt. transferred to [facility] on 1/24 for further management.
88F hx of advanced dementia (at baseline, bedbound, nonverbal, can track and take PO feeds), HTN, HLD, DM, who presented to [facility] 1/19 with AMS, cough, subjective fevers, admitted to MICU on BIPAP, and started on CTX/azithromycin for sepsis 2/2 PNA and/or UTI. Workup positive for UCx + Proteus (sensitive to CTX) and right basilar opacity on CXR. Course c/b on LLE DVT, on patient specific heparin gtt (goal 50-60 due to high bleeding risk - hematuria) and hypernatremia to 150s, now resolved with IVF. Mental status improved, however patient failed speech and swallow, with GI planning tentative PEG tube placement at OSH on 1/24. Palliative was consulted, however family requested transfer to [facility]. Pt. transferred to [facility] on 1/24 for further management.
89yo female with h/o advanced dementia (at baseline, bedbound, nonverbal, can track and take PO feeds), HTN, HLD, DM, who presented to [facility] 1/19 with AMS, cough, subjective fevers, admitted to MICU on BIPAP, and started on CTX/azithromycin for sepsis 2/2 PNA and/or UTI. Workup positive for UCx + Proteus (sensitive to CTX) and right basilar opacity on CXR. Course c/b on LLE DVT now on heparin gtt (goal 50-60 due to high bleeding risk - hematuria) and hypernatremia to 150s, now resolved with IVF. Mental status improved and back to baseline. GI consulted for peg evaluation as pt failed speech and swallow at OSH
89yo female with h/o advanced dementia (at baseline, bedbound, nonverbal, can track and take PO feeds), HTN, HLD, DM, who presented to [facility] 1/19 with AMS, cough, subjective fevers, admitted to MICU on BIPAP, and started on CTX/azithromycin for sepsis 2/2 PNA and/or UTI. Workup positive for UCx + Proteus (sensitive to CTX) and right basilar opacity on CXR. Course c/b on LLE DVT now on heparin gtt (goal 50-60 due to high bleeding risk - hematuria) and hypernatremia to 150s, now resolved with IVF. Mental status improved and back to baseline. GI consulted for peg evaluation as pt failed speech and swallow at OSH
88F hx of advanced dementia (at baseline, bedbound, nonverbal, can track and take PO feeds), HTN, HLD, DM, who presented to [facility] 1/19 with AMS, cough, subjective fevers, admitted to MICU on BIPAP, and started on CTX/azithromycin for sepsis 2/2 PNA and/or UTI. Workup positive for UCx + Proteus (sensitive to CTX) and right basilar opacity on CXR. Course c/b on LLE DVT, on patient specific heparin gtt (goal 50-60 due to high bleeding risk - hematuria) and hypernatremia to 150s, now resolved with IVF. Mental status improved, however patient failed speech and swallow, with GI planning tentative PEG tube placement at OSH on 1/24. Palliative was consulted, however family requested transfer [facility]. Pt. transferred to [facility] on 1/24 for further management.

## 2019-01-30 NOTE — PROGRESS NOTE ADULT - SUBJECTIVE AND OBJECTIVE BOX
Patient is a 88y old  Female who presents with a chief complaint of transfer from [facility] (30 Jan 2019 10:50)      SUBJECTIVE / OVERNIGHT EVENTS: no overnight events  seen earlier today      ROS:  Resp: No cough no sputum production  CVS: No chest pain no palpitations no orthopnea  GI: no N/V/D  : no dysuria, no hematuria  Neuro: no weakness no paresthesias  Heme: No petechiae no easy bruising  Msk: No joint pain no swelling  Skin: No rash no itching        MEDICATIONS  (STANDING):    MEDICATIONS  (PRN):        CAPILLARY BLOOD GLUCOSE        I&O's Summary      Vital Signs Last 24 Hrs  T(C): 36.4 (30 Jan 2019 14:12), Max: 36.8 (29 Jan 2019 22:02)  T(F): 97.5 (30 Jan 2019 14:12), Max: 98.2 (29 Jan 2019 22:02)  HR: 91 (30 Jan 2019 14:12) (83 - 99)  BP: 152/77 (30 Jan 2019 14:12) (149/66 - 179/108)  BP(mean): --  RR: 18 (30 Jan 2019 14:12) (18 - 18)  SpO2: 97% (30 Jan 2019 14:12) (97% - 100%)    PHYSICAL EXAM: vital signs as above  in no apparent distress  Neck: Supple, no JVD, no thyromegaly  Lungs: no rhonchi, scattered bilateral wheeze, no crackles very poor cooperation  CVS: S1 S2 soft ejection systolic murmur best heard at left sternal border   Abdomen: no tenderness, no organomegaly, BS present  Neuro: minimally responsive no focal weakness  Psych: minimally responsive   Skin: warm, dry  Ext: no cyanosis or clubbing, no edema  Msk: no joint swelling or deformities  Back: no CVA tenderness    LABS: none                      All consultant(s) notes reviewed and care discussed with other providers        Contact Number, Dr Angel [phone]
Follow Up:      Inverval History/ROS:Patient is a 88y old  Female who presents with a chief complaint of transfer from [facility] (25 Jan 2019 11:55)  Lethargic. On face Mask.  No fever.      Allergies    No Known Allergies    Intolerances        ANTIMICROBIALS:  piperacillin/tazobactam IVPB. 3.375 every 8 hours      OTHER MEDS:  dextrose 40% Gel 15 Gram(s) Oral once PRN  dextrose 5% + sodium chloride 0.45%. 1000 milliLiter(s) IV Continuous <Continuous>  dextrose 5%. 1000 milliLiter(s) IV Continuous <Continuous>  dextrose 50% Injectable 12.5 Gram(s) IV Push once  dextrose 50% Injectable 25 Gram(s) IV Push once  dextrose 50% Injectable 25 Gram(s) IV Push once  glucagon  Injectable 1 milliGRAM(s) IntraMuscular once PRN  heparin  Infusion 600 Unit(s)/Hr IV Continuous <Continuous>  influenza   Vaccine 0.5 milliLiter(s) IntraMuscular once  insulin lispro (HumaLOG) corrective regimen sliding scale   SubCutaneous every 6 hours  potassium phosphate IVPB 15 milliMole(s) IV Intermittent once      Vital Signs Last 24 Hrs  T(C): 36.6 (25 Jan 2019 05:45), Max: 36.6 (25 Jan 2019 05:45)  T(F): 97.8 (25 Jan 2019 05:45), Max: 97.8 (25 Jan 2019 05:45)  HR: 74 (25 Jan 2019 05:45) (73 - 74)  BP: 138/64 (25 Jan 2019 05:45) (138/64 - 150/75)  BP(mean): --  RR: 18 (25 Jan 2019 05:45) (18 - 18)  SpO2: 96% (25 Jan 2019 05:45) (95% - 96%)    PHYSICAL EXAM:  General: [x ] lethargic  HEAD/EYES: [ ] PERRL [ x] white sclera [ ] icterus  ENT:  [x ] normal [ ] supple [ ] thrush [ ] pharyngeal exudate  Cardiovascular:   [ ] murmur [x ] normal [ ] PPM/AICD  Respiratory:  [x ] course BS  GI:  [x ] soft, non-tender, normal bowel sounds  :  [ ] reilly [ ] no CVA tenderness   Musculoskeletal:  [ ] no synovitis  Neurologic:  [ ] non-verbal  Skin:  [x ] no rash  Lymph: x[ ] no lymphadenopathy  Psychiatric:  [ ] appropriate affect [ ] alert & oriented  Lines:  [x ] no phlebitis [ ] central line                                11.7   8.00  )-----------( 149      ( 25 Jan 2019 07:40 )             37.3       01-25    141  |  111<H>  |  5<L>  ----------------------------<  198<H>  3.2<L>   |  20<L>  |  0.58    Ca    10.2      25 Jan 2019 07:40  Phos  2.1     01-25  Mg     1.9     01-25    TPro  5.9<L>  /  Alb  1.9<L>  /  TBili  0.2  /  DBili  x   /  AST  52<H>  /  ALT  13  /  AlkPhos  65  01-23          MICROBIOLOGY:    RADIOLOGY:
INTERVAL HPI/OVERNIGHT EVENTS:    lethargic  npo   no new gi events     MEDICATIONS  (STANDING):  dextrose 5% + sodium chloride 0.45%. 1000 milliLiter(s) (20 mL/Hr) IV Continuous <Continuous>  dextrose 5%. 1000 milliLiter(s) (50 mL/Hr) IV Continuous <Continuous>  influenza   Vaccine 0.5 milliLiter(s) IntraMuscular once    MEDICATIONS  (PRN):      Allergies    No Known Allergies    Intolerances        Review of Systems: *pt minimally verbal to nonverbal, unable to obtain ROS           Vital Signs Last 24 Hrs  T(C): 36.5 (30 Jan 2019 06:19), Max: 36.8 (29 Jan 2019 22:02)  T(F): 97.7 (30 Jan 2019 06:19), Max: 98.2 (29 Jan 2019 22:02)  HR: 83 (30 Jan 2019 06:19) (78 - 99)  BP: 149/66 (30 Jan 2019 06:19) (128/68 - 179/108)  BP(mean): --  RR: 18 (30 Jan 2019 06:19) (18 - 20)  SpO2: 100% (30 Jan 2019 06:19) (99% - 100%)    PHYSICAL EXAM:    Constitutional: NAD  HEENT: EOMI, throat clear  Neck: No LAD, supple  Respiratory: CTA and P  Cardiovascular: S1 and S2, RRR, no M  Gastrointestinal: BS+, soft, NT/ND, neg HSM,  Extremities: No peripheral edema, neg clubbing, cyanosis  Vascular: 2+ peripheral pulses  Neurological: A/O x0; nonverbal  Psychiatric: lethargic  Skin: No rashes      LABS:                RADIOLOGY & ADDITIONAL TESTS:
INTERVAL HPI/OVERNIGHT EVENTS:    nonverbal/lethargic  family wishing for hospice    MEDICATIONS  (STANDING):  dextrose 5% + sodium chloride 0.45%. 1000 milliLiter(s) (20 mL/Hr) IV Continuous <Continuous>  dextrose 5%. 1000 milliLiter(s) (50 mL/Hr) IV Continuous <Continuous>  influenza   Vaccine 0.5 milliLiter(s) IntraMuscular once    MEDICATIONS  (PRN):      Allergies    No Known Allergies    Intolerances        Review of Systems: nonverbal          Vital Signs Last 24 Hrs  T(C): 35.4 (28 Jan 2019 20:28), Max: 35.4 (28 Jan 2019 20:28)  T(F): 95.7 (28 Jan 2019 20:28), Max: 95.7 (28 Jan 2019 20:28)  HR: 81 (28 Jan 2019 20:28) (70 - 81)  BP: 157/89 (28 Jan 2019 20:28) (147/78 - 157/89)  BP(mean): --  RR: 20 (28 Jan 2019 20:28) (20 - 20)  SpO2: 95% (28 Jan 2019 20:28) (95% - 95%)    PHYSICAL EXAM:    Constitutional: NAD  HEENT: EOMI, throat clear  Neck: No LAD, supple  Respiratory: CTA and P  Cardiovascular: S1 and S2, RRR, no M  Gastrointestinal: BS+, soft, NT/ND, neg HSM,  Extremities: No peripheral edema, neg clubbing, cyanosis  Vascular: 2+ peripheral pulses  Neurological: A/O x0, nonverbal  Psychiatric: lethargic  Skin: No rashes      LABS:                        10.9   7.34  )-----------( 173      ( 28 Jan 2019 03:20 )             34.1           PTT - ( 28 Jan 2019 10:23 )  PTT:55.1 SEC      RADIOLOGY & ADDITIONAL TESTS:
INTERVAL HPI/OVERNIGHT EVENTS:    pt nonverbal  daughter bedside wishing to feed the patient     MEDICATIONS  (STANDING):  dextrose 5% + sodium chloride 0.45%. 1000 milliLiter(s) (20 mL/Hr) IV Continuous <Continuous>  dextrose 5%. 1000 milliLiter(s) (50 mL/Hr) IV Continuous <Continuous>  dextrose 50% Injectable 12.5 Gram(s) IV Push once  dextrose 50% Injectable 25 Gram(s) IV Push once  dextrose 50% Injectable 25 Gram(s) IV Push once  heparin  Infusion 600 Unit(s)/Hr (6 mL/Hr) IV Continuous <Continuous>  influenza   Vaccine 0.5 milliLiter(s) IntraMuscular once  insulin lispro (HumaLOG) corrective regimen sliding scale   SubCutaneous every 6 hours  piperacillin/tazobactam IVPB. 3.375 Gram(s) IV Intermittent every 8 hours  potassium chloride  10 mEq/100 mL IVPB 10 milliEquivalent(s) IV Intermittent every 1 hour  potassium phosphate IVPB 15 milliMole(s) IV Intermittent once    MEDICATIONS  (PRN):  dextrose 40% Gel 15 Gram(s) Oral once PRN Blood Glucose LESS THAN 70 milliGRAM(s)/deciliter  glucagon  Injectable 1 milliGRAM(s) IntraMuscular once PRN Glucose LESS THAN 70 milligrams/deciliter      Allergies    No Known Allergies    Intolerances        Review of Systems: *pt minimally verbal to nonverbal, unable to obtain ROS           Vital Signs Last 24 Hrs  T(C): 36.6 (25 Jan 2019 05:45), Max: 36.6 (25 Jan 2019 05:45)  T(F): 97.8 (25 Jan 2019 05:45), Max: 97.8 (25 Jan 2019 05:45)  HR: 74 (25 Jan 2019 05:45) (60 - 74)  BP: 138/64 (25 Jan 2019 05:45) (138/64 - 169/57)  BP(mean): --  RR: 18 (25 Jan 2019 05:45) (18 - 18)  SpO2: 96% (25 Jan 2019 05:45) (95% - 100%)    PHYSICAL EXAM:    Constitutional: NAD  HEENT: EOMI, throat clear  Neck: No LAD, supple  Respiratory: CTA and P  Cardiovascular: S1 and S2, RRR, no M  Gastrointestinal: BS+, soft, NT/ND, neg HSM,  Extremities: No peripheral edema, neg clubbing, cyanosis  Vascular: 2+ peripheral pulses  Neurological: A/O x 0  Psychiatric: lethargic  Skin: No rashes      LABS:                        11.7   8.00  )-----------( 149      ( 25 Jan 2019 07:40 )             37.3     01-25    141  |  111<H>  |  5<L>  ----------------------------<  198<H>  3.2<L>   |  20<L>  |  0.58    Ca    10.2      25 Jan 2019 07:40  Phos  2.1     01-25  Mg     1.9     01-25    TPro  5.9<L>  /  Alb  1.9<L>  /  TBili  0.2  /  DBili  x   /  AST  52<H>  /  ALT  13  /  AlkPhos  65  01-23    PTT - ( 25 Jan 2019 07:40 )  PTT:70.9 SEC      RADIOLOGY & ADDITIONAL TESTS:
Patient is a 88y old  Female who presents with a chief complaint of transfer from [facility] (28 Jan 2019 16:07)    SUBJECTIVE / OVERNIGHT EVENTS: no overnight events    ROS:  Resp: No cough no sputum production  CVS: No chest pain no palpitations no orthopnea  GI: no N/V/D  : no dysuria, no hematuria  Neuro: no weakness no paresthesias  Heme: No petechiae no easy bruising  Msk: No joint pain no swelling  Skin: No rash no itching        MEDICATIONS  (STANDING):  dextrose 5% + sodium chloride 0.45%. 1000 milliLiter(s) (20 mL/Hr) IV Continuous <Continuous>  dextrose 5%. 1000 milliLiter(s) (50 mL/Hr) IV Continuous <Continuous>  influenza   Vaccine 0.5 milliLiter(s) IntraMuscular once    MEDICATIONS  (PRN):        CAPILLARY BLOOD GLUCOSE        I&O's Summary      Vital Signs Last 24 Hrs  T(C): 35 (28 Jan 2019 14:17), Max: 36.4 (28 Jan 2019 05:32)  T(F): 95 (28 Jan 2019 14:17), Max: 97.5 (28 Jan 2019 05:32)  HR: 70 (28 Jan 2019 14:17) (69 - 87)  BP: 147/78 (28 Jan 2019 14:17) (142/101 - 150/69)  BP(mean): --  RR: 20 (28 Jan 2019 14:17) (20 - 22)  SpO2: 96% (28 Jan 2019 05:32) (96% - 100%)    PHYSICAL EXAM: vital signs as above  in no apparent distress  Neck: Supple, no JVD, no thyromegaly  Lungs: no rhonchi, scattered bilateral wheeze, no crackles very poor cooperation  CVS: S1 S2 soft ejection systolic murmur best heard at left sternal border   Abdomen: no tenderness, no organomegaly, BS present  Neuro: minimally responsive no focal weakness  Psych: minimally responsive   Skin: warm, dry  Ext: no cyanosis or clubbing, no edema  Msk: no joint swelling or deformities  Back: no CVA tenderness    LABS:                        10.9   7.34  )-----------( 173      ( 28 Jan 2019 03:20 )             34.1           PTT - ( 28 Jan 2019 10:23 )  PTT:55.1 SEC            All consultant(s) notes reviewed and care discussed with other providers        Contact Number, Dr Angel [phone]
Patient is a 88y old  Female who presents with a chief complaint of transfer from [facility] (24 Jan 2019 16:52)      SUBJECTIVE / OVERNIGHT EVENTS: no overnight events    ROS:  Resp: No cough no sputum production  CVS: No chest pain no palpitations no orthopnea  GI: no N/V/D  : no dysuria, no hematuria  Neuro: no weakness no paresthesias  Heme: No petechiae no easy bruising  Msk: No joint pain no swelling  Skin: No rash no itching        MEDICATIONS  (STANDING):  dextrose 5% + sodium chloride 0.45%. 1000 milliLiter(s) (20 mL/Hr) IV Continuous <Continuous>  dextrose 5%. 1000 milliLiter(s) (50 mL/Hr) IV Continuous <Continuous>  dextrose 50% Injectable 12.5 Gram(s) IV Push once  dextrose 50% Injectable 25 Gram(s) IV Push once  dextrose 50% Injectable 25 Gram(s) IV Push once  heparin  Infusion 600 Unit(s)/Hr (6 mL/Hr) IV Continuous <Continuous>  influenza   Vaccine 0.5 milliLiter(s) IntraMuscular once  insulin lispro (HumaLOG) corrective regimen sliding scale   SubCutaneous every 6 hours  piperacillin/tazobactam IVPB. 3.375 Gram(s) IV Intermittent every 8 hours  potassium chloride  10 mEq/100 mL IVPB 10 milliEquivalent(s) IV Intermittent every 1 hour  potassium phosphate IVPB 15 milliMole(s) IV Intermittent once    MEDICATIONS  (PRN):  dextrose 40% Gel 15 Gram(s) Oral once PRN Blood Glucose LESS THAN 70 milliGRAM(s)/deciliter  glucagon  Injectable 1 milliGRAM(s) IntraMuscular once PRN Glucose LESS THAN 70 milligrams/deciliter        CAPILLARY BLOOD GLUCOSE      POCT Blood Glucose.: 168 mg/dL (25 Jan 2019 05:42)  POCT Blood Glucose.: 150 mg/dL (25 Jan 2019 00:57)  POCT Blood Glucose.: 121 mg/dL (24 Jan 2019 22:15)  POCT Blood Glucose.: 157 mg/dL (24 Jan 2019 18:09)  POCT Blood Glucose.: 160 mg/dL (24 Jan 2019 12:07)    I&O's Summary    24 Jan 2019 07:01  -  25 Jan 2019 07:00  --------------------------------------------------------  IN: 620.5 mL / OUT: 0 mL / NET: 620.5 mL        Vital Signs Last 24 Hrs  T(C): 36.6 (25 Jan 2019 05:45), Max: 36.6 (25 Jan 2019 05:45)  T(F): 97.8 (25 Jan 2019 05:45), Max: 97.8 (25 Jan 2019 05:45)  HR: 74 (25 Jan 2019 05:45) (60 - 74)  BP: 138/64 (25 Jan 2019 05:45) (138/64 - 169/57)  BP(mean): --  RR: 18 (25 Jan 2019 05:45) (18 - 18)  SpO2: 96% (25 Jan 2019 05:45) (95% - 100%)    PHYSICAL EXAM: vital signs as above  in no apparent distress  Neck: Supple, no JVD, no thyromegaly  Lungs: no rhonchi, scattered bilateral wheeze, no crackles very poor cooperation  CVS: S1 S2 soft ejection systolic murmur best heard at left sternal border   Abdomen: no tenderness, no organomegaly, BS present  Neuro: minimally responsive no focal weakness  Psych: minimally responsive   Skin: warm, dry  Ext: no cyanosis or clubbing, no edema  Msk: no joint swelling or deformities  Back: no CVA tenderness,      LABS:                        11.7   8.00  )-----------( 149      ( 25 Jan 2019 07:40 )             37.3     01-25    141  |  111<H>  |  5<L>  ----------------------------<  198<H>  3.2<L>   |  20<L>  |  0.58    Ca    10.2      25 Jan 2019 07:40  Phos  2.1     01-25  Mg     1.9     01-25    TPro  5.9<L>  /  Alb  1.9<L>  /  TBili  0.2  /  DBili  x   /  AST  52<H>  /  ALT  13  /  AlkPhos  65  01-23    PTT - ( 25 Jan 2019 07:40 )  PTT:70.9 SEC      All consultant(s) notes reviewed and care discussed with other providers        Contact Number, Dr Angel [phone]
Patient is a 88y old  Female who presents with a chief complaint of transfer from [facility] (29 Jan 2019 11:48)      SUBJECTIVE / OVERNIGHT EVENTS: no overnight events    ROS:  Resp: No cough no sputum production  CVS: No chest pain no palpitations no orthopnea  GI: no N/V/D  : no dysuria, no hematuria  Neuro: no weakness no paresthesias  Heme: No petechiae no easy bruising  Msk: No joint pain no swelling  Skin: No rash no itching        MEDICATIONS  (STANDING):  dextrose 5% + sodium chloride 0.45%. 1000 milliLiter(s) (20 mL/Hr) IV Continuous <Continuous>  dextrose 5%. 1000 milliLiter(s) (50 mL/Hr) IV Continuous <Continuous>  influenza   Vaccine 0.5 milliLiter(s) IntraMuscular once    MEDICATIONS  (PRN):        CAPILLARY BLOOD GLUCOSE        I&O's Summary      Vital Signs Last 24 Hrs  T(C): 35.4 (28 Jan 2019 20:28), Max: 35.4 (28 Jan 2019 20:28)  T(F): 95.7 (28 Jan 2019 20:28), Max: 95.7 (28 Jan 2019 20:28)  HR: 81 (28 Jan 2019 20:28) (70 - 81)  BP: 157/89 (28 Jan 2019 20:28) (147/78 - 157/89)  BP(mean): --  RR: 20 (28 Jan 2019 20:28) (20 - 20)  SpO2: 95% (28 Jan 2019 20:28) (95% - 95%)    PHYSICAL EXAM: vital signs as above  in no apparent distress  Neck: Supple, no JVD, no thyromegaly  Lungs: no rhonchi, scattered bilateral wheeze, no crackles very poor cooperation  CVS: S1 S2 soft ejection systolic murmur best heard at left sternal border   Abdomen: no tenderness, no organomegaly, BS present  Neuro: minimally responsive no focal weakness  Psych: minimally responsive   Skin: warm, dry  Ext: no cyanosis or clubbing, no edema  Msk: no joint swelling or deformities  Back: no CVA tenderness    LABS:                        10.9   7.34  )-----------( 173      ( 28 Jan 2019 03:20 )             34.1           PTT - ( 28 Jan 2019 10:23 )  PTT:55.1 SEC            All consultant(s) notes reviewed and care discussed with other providers        Contact Number, Dr Angel [phone]
Patient is a 88y old  Female who presents with a chief complaint of transfer from [facility] (26 Jan 2019 13:44)      SUBJECTIVE / OVERNIGHT EVENTS: no overnight events    ROS:  Resp: No cough no sputum production  CVS: No chest pain no palpitations no orthopnea  GI: no N/V/D  : no dysuria, no hematuria  Neuro: no weakness no paresthesias  Heme: No petechiae no easy bruising  Msk: No joint pain no swelling  Skin: No rash no itching        MEDICATIONS  (STANDING):  dextrose 5% + sodium chloride 0.45%. 1000 milliLiter(s) (20 mL/Hr) IV Continuous <Continuous>  dextrose 5%. 1000 milliLiter(s) (50 mL/Hr) IV Continuous <Continuous>  heparin  Infusion 600 Unit(s)/Hr (6 mL/Hr) IV Continuous <Continuous>  influenza   Vaccine 0.5 milliLiter(s) IntraMuscular once  piperacillin/tazobactam IVPB. 3.375 Gram(s) IV Intermittent every 8 hours    MEDICATIONS  (PRN):        CAPILLARY BLOOD GLUCOSE      POCT Blood Glucose.: 168 mg/dL (26 Jan 2019 12:23)    I&O's Summary      Vital Signs Last 24 Hrs  T(C): 36.7 (27 Jan 2019 10:46), Max: 36.7 (27 Jan 2019 10:46)  T(F): 98 (27 Jan 2019 10:46), Max: 98 (27 Jan 2019 10:46)  HR: 64 (27 Jan 2019 10:46) (64 - 98)  BP: 146/81 (27 Jan 2019 10:46) (146/81 - 174/68)  BP(mean): --  RR: 21 (27 Jan 2019 10:46) (20 - 22)  SpO2: 100% (27 Jan 2019 10:46) (99% - 100%)    PHYSICAL EXAM: vital signs as above  in no apparent distress  Neck: Supple, no JVD, no thyromegaly  Lungs: no rhonchi, scattered bilateral wheeze, no crackles very poor cooperation  CVS: S1 S2 soft ejection systolic murmur best heard at left sternal border   Abdomen: no tenderness, no organomegaly, BS present  Neuro: minimally responsive no focal weakness  Psych: minimally responsive   Skin: warm, dry  Ext: no cyanosis or clubbing, no edema  Msk: no joint swelling or deformities  Back: no CVA tenderness    LABS:                        11.2   7.14  )-----------( 141      ( 26 Jan 2019 07:33 )             36.7     01-26    138  |  111<H>  |  6<L>  ----------------------------<  161<H>  4.1   |  16<L>  |  0.64    Ca    9.9      26 Jan 2019 13:45  Phos  3.2     01-26  Mg     2.0     01-26      PTT - ( 27 Jan 2019 09:20 )  PTT:37.7 SEC            All consultant(s) notes reviewed and care discussed with other providers        Contact Number, Dr Angel [phone]
INTERVAL HPI/OVERNIGHT EVENTS:    nonverbal  no new gi events    MEDICATIONS  (STANDING):  dextrose 5% + sodium chloride 0.45%. 1000 milliLiter(s) (20 mL/Hr) IV Continuous <Continuous>  dextrose 5%. 1000 milliLiter(s) (50 mL/Hr) IV Continuous <Continuous>  heparin  Infusion 500 Unit(s)/Hr (5 mL/Hr) IV Continuous <Continuous>  influenza   Vaccine 0.5 milliLiter(s) IntraMuscular once    MEDICATIONS  (PRN):      Allergies    No Known Allergies    Intolerances        Review of Systems: *pt minimally verbal to nonverbal, unable to obtain ROS           Vital Signs Last 24 Hrs  T(C): 36.4 (28 Jan 2019 05:32), Max: 36.4 (28 Jan 2019 05:32)  T(F): 97.5 (28 Jan 2019 05:32), Max: 97.5 (28 Jan 2019 05:32)  HR: 70 (28 Jan 2019 05:32) (69 - 103)  BP: 146/73 (28 Jan 2019 05:32) (142/101 - 156/85)  BP(mean): --  RR: 20 (28 Jan 2019 05:32) (20 - 22)  SpO2: 96% (28 Jan 2019 05:32) (96% - 100%)    PHYSICAL EXAM:    Constitutional: NAD  HEENT: EOMI, throat clear  Neck: No LAD, supple  Respiratory: CTA and P  Cardiovascular: S1 and S2, RRR, no M  Gastrointestinal: BS+, soft, NT/ND, neg HSM,  Extremities: No peripheral edema, neg clubbing, cyanosis  Vascular: 2+ peripheral pulses  Neurological: A/O x 0  Psychiatric: lethargic  Skin: No rashes      LABS:                        10.9   7.34  )-----------( 173      ( 28 Jan 2019 03:20 )             34.1     01-26    138  |  111<H>  |  6<L>  ----------------------------<  161<H>  4.1   |  16<L>  |  0.64    Ca    9.9      26 Jan 2019 13:45  Phos  3.2     01-26  Mg     2.0     01-26      PTT - ( 28 Jan 2019 10:23 )  PTT:55.1 SEC      RADIOLOGY & ADDITIONAL TESTS:
Patient is a 88y old  Female who presents with a chief complaint of transfer from [facility] (25 Jan 2019 13:55)      SUBJECTIVE / OVERNIGHT EVENTS: no overnight events    ROS:  Resp: No cough no sputum production  CVS: No chest pain no palpitations no orthopnea  GI: no N/V/D  : no dysuria, no hematuria  Neuro: no weakness no paresthesias  Heme: No petechiae no easy bruising  Msk: No joint pain no swelling  Skin: No rash no itching        MEDICATIONS  (STANDING):  dextrose 5% + sodium chloride 0.45%. 1000 milliLiter(s) (20 mL/Hr) IV Continuous <Continuous>  dextrose 5%. 1000 milliLiter(s) (50 mL/Hr) IV Continuous <Continuous>  dextrose 50% Injectable 12.5 Gram(s) IV Push once  dextrose 50% Injectable 25 Gram(s) IV Push once  dextrose 50% Injectable 25 Gram(s) IV Push once  heparin  Infusion 650 Unit(s)/Hr (6.5 mL/Hr) IV Continuous <Continuous>  influenza   Vaccine 0.5 milliLiter(s) IntraMuscular once  insulin lispro (HumaLOG) corrective regimen sliding scale   SubCutaneous every 6 hours  piperacillin/tazobactam IVPB. 3.375 Gram(s) IV Intermittent every 8 hours    MEDICATIONS  (PRN):  dextrose 40% Gel 15 Gram(s) Oral once PRN Blood Glucose LESS THAN 70 milliGRAM(s)/deciliter  glucagon  Injectable 1 milliGRAM(s) IntraMuscular once PRN Glucose LESS THAN 70 milligrams/deciliter        CAPILLARY BLOOD GLUCOSE      POCT Blood Glucose.: 168 mg/dL (26 Jan 2019 12:23)  POCT Blood Glucose.: 147 mg/dL (26 Jan 2019 06:37)  POCT Blood Glucose.: 142 mg/dL (26 Jan 2019 00:57)  POCT Blood Glucose.: 148 mg/dL (25 Jan 2019 17:44)    I&O's Summary      Vital Signs Last 24 Hrs  T(C): 36.4 (26 Jan 2019 05:41), Max: 36.4 (25 Jan 2019 22:03)  T(F): 97.6 (26 Jan 2019 05:41), Max: 97.6 (25 Jan 2019 22:03)  HR: 83 (26 Jan 2019 05:41) (69 - 88)  BP: 135/95 (26 Jan 2019 05:41) (135/95 - 149/78)  BP(mean): --  RR: 20 (26 Jan 2019 05:41) (20 - 20)  SpO2: 100% (26 Jan 2019 05:41) (99% - 100%)    PHYSICAL EXAM: vital signs as above  in no apparent distress  Neck: Supple, no JVD, no thyromegaly  Lungs: no rhonchi, scattered bilateral wheeze, no crackles very poor cooperation  CVS: S1 S2 soft ejection systolic murmur best heard at left sternal border   Abdomen: no tenderness, no organomegaly, BS present  Neuro: minimally responsive no focal weakness  Psych: minimally responsive   Skin: warm, dry  Ext: no cyanosis or clubbing, no edema  Msk: no joint swelling or deformities  Back: no CVA tenderness    LABS:                        11.2   7.14  )-----------( 141      ( 26 Jan 2019 07:33 )             36.7     01-25    141  |  111<H>  |  5<L>  ----------------------------<  198<H>  3.2<L>   |  20<L>  |  0.58    Ca    10.2      25 Jan 2019 07:40  Phos  2.1     01-25  Mg     1.9     01-25      PTT - ( 26 Jan 2019 07:33 )  PTT:70.6 SEC            All consultant(s) notes reviewed and care discussed with other providers        Contact Number, Dr Angel [phone]
Severe sepsis without septic shock

## 2019-01-30 NOTE — GOALS OF CARE CONVERSATION - PERSONAL ADVANCE DIRECTIVE - NS PRO AD NO ADVANCE DIRECTIVE
Yes/pt has dementia, Dtrs DEBORAH Iglesias [phone] and Balbir Piper [phone]
pt has dementia, Dterwin Iglesias [phone] and Balbir Piper [phone]/Yes
Yes/pt has dementia, Dtrs DEBORAH Iglesias [phone] and Balbir Piper [phone]

## 2019-01-30 NOTE — PROGRESS NOTE ADULT - PROBLEM SELECTOR PLAN 4
resolved  no intervention required at this time

## 2019-01-30 NOTE — GOALS OF CARE CONVERSATION - PERSONAL ADVANCE DIRECTIVE - CONVERSATION DETAILS
[facility]    Pt was discharged from [facility] with home hospice care from [facility].    [facility] RN verified o2 concentrator has been received in pt's home.  [facility] RN will contact pt's family and arrange for home visit to address concerns regarding pt's new prognosis and provide support and educate family regarding symptom control and supportive  care.    [facility] 24/7 tel # [phone].
[facility]    [facility] RN evaluated pt and met with pt's daughters. Pt is known to [facility], pt had been receiving care from [facility] and was discharged (was medically stable).  Per Medicare requirements, pt needed to be seen by [facility] MD to recertify for hospice. Pt was examined by [facility] MD, Dr Carla Triplett, who visited pt at [facility]. He found pt eligible for hospice care.    Consent forms will need to be signed by pt's daughter/decision maker Sally ("Cait"Graham Iglesias - who was not present at this visit.  Consent forms were left at bedside for her; she will be visiting this evening.    Once signed consents are received, [facility] RN will order DME (o2 concentrator and bed - pt's family states pt has a hospital bed at home, but it is not functional) [facility].    [facility] RN will follow up with pt and family on Tues 1/29/19.
[facility]    Signed consents have been received.    O2 concentrator has been ordered for delivery to pt's home today. [facility] RN will verify delivery and set up home visit from [facility] RN for today, upon hospital discharge.

## 2021-02-05 NOTE — CONSULT NOTE ADULT - SUBJECTIVE AND OBJECTIVE BOX
Chief Complaint:  Patient is a 88y old  Female who presents with a chief complaint of transfer from [facility] (23 Jan 2019 23:36)    H/O: Osteoarthritis  GERD (Gastroesophageal Reflux Disease)  ? Chronic CHF  Diabetes Mellitus  HTN (Hypertension), Benign  Diabetic Acidosis, Type II  History of Arthroscopy of Knee -  right  History of Arthroscopy  History of Total Knee  left in 2003     HPI: **pt is demented/nonverbal, history obtained from [facility] Transfer Paperwork and chart     88F hx of advanced dementia (at baseline, bedbound, nonverbal, can track and take PO feeds), HTN, HLD, DM, who presented to [facility] on 1/19 with altered mental status, cough, and shortness of breath. Pt. had productive cough with yellow sputum, associated with SOB, and experienced subjective fevers at home over the past three days prior to admission. She also had decreased PO intake, home diet usually consists of pureed food and thickened fluids, which pt. was noted to be coughing with. In addition, pts. urine had also appeared pink to HHA/daughter. Mental status also worsened with pt. becoming more lethargic prompting family to bring her to the ED. Upon arrival pt was placed on BIPAP and admitted to MICU. CTH was negative for acute bleed, and encephalopathy was attributed to sepsis possibly 2/2 PNA. She was started on ceftriaxone for possible UTI  and PNA. Chest XRAY on admission showed no acute pulmonary abnormality, however CXR from 1/22 showed increase in right basilar opacity. Further infectious workup - BCx remained negative, UCx positive for proteus sensitive to ceftriaxone. Course was complicated by acute LLE DVT for which she was started on a patient specific heparin drip (goal 50-60, due to high risk of bleeding). She was also hypernatremic to the 150s, and started on D5W initially, later switched to D5 1/2 NS, with Na level 143 prior to transfer. Multiple attempts to place an NG tube was unsuccessful given large hiatal hernia. Mental status gradually improved, BIPAP de-escalated to NC, and pt. was transferred to medicine floors. Per family pt. was back to her baseline. Floor course c/b episode of respiratory distress, however BIPAP was not started given secretions and lethargy, oxygenation wnl with 5L NC. ABG was unremarkable for CO2 retention. Speech and swallow was consulted, not recommended for PEG tube. Palliative care was consulted however family requested transfer to [facility]. GI consulted for PEG placement at the request of the patients family     On arrival to [facility] - . on heparin drip at 490 units/hr and D5 1/2 NS @50cc/hr. PTT prior to transfer noted to be 58. (23 Jan 2019 23:36)      No Known Allergies      dextrose 40% Gel 15 Gram(s) Oral once PRN  dextrose 5% + sodium chloride 0.45%. 1000 milliLiter(s) IV Continuous <Continuous>  dextrose 5%. 1000 milliLiter(s) IV Continuous <Continuous>  dextrose 50% Injectable 12.5 Gram(s) IV Push once  dextrose 50% Injectable 25 Gram(s) IV Push once  dextrose 50% Injectable 25 Gram(s) IV Push once  glucagon  Injectable 1 milliGRAM(s) IntraMuscular once PRN  heparin  Infusion 700 Unit(s)/Hr IV Continuous <Continuous>  influenza   Vaccine 0.5 milliLiter(s) IntraMuscular once  insulin lispro (HumaLOG) corrective regimen sliding scale   SubCutaneous every 6 hours        FAMILY HISTORY:  No pertinent family history in first degree relatives        Review of Systems: *nonverbal      Relevant Family History:   n/c    Relevant Social History: n/c      Physical Exam:    Vital Signs:  Vital Signs Last 24 Hrs  T(C): 36.4 (24 Jan 2019 05:10), Max: 36.4 (24 Jan 2019 05:10)  T(F): 97.6 (24 Jan 2019 05:10), Max: 97.6 (24 Jan 2019 05:10)  HR: 65 (24 Jan 2019 10:31) (58 - 76)  BP: 142/58 (24 Jan 2019 10:31) (127/80 - 169/75)  BP(mean): --  RR: 16 (24 Jan 2019 05:10) (16 - 16)  SpO2: 95% (24 Jan 2019 10:31) (85% - 100%)  Daily Height in cm: 152.4 (23 Jan 2019 21:52)    Daily     General:  Appears stated age, well-groomed, nad  HEENT:  NC/AT,  conjunctivae clear and pink, no thyromegaly, nodules, adenopathy, no JVD  Chest:  Full & symmetric excursion, no increased effort, breath sounds clear  Cardiovascular:  Regular rhythm, S1, S2, no murmur/rub/S3/S4, no abdominal bruit, no edema  Abdomen:  Soft, non-tender, non-distended, normoactive bowel sounds,  no masses ,no hepatosplenomeagaly, no signs of chronic liver disease  Extremities:  no cyanosis,clubbing or edema  Skin:  No rash/erythema/ecchymoses/petechiae/wounds/abscess/warm/dry  Neuro/Psych:  A&O x0, no asterixis, no tremor, no encephalopathy    Laboratory:                            11.7   7.14  )-----------( 52       ( 24 Jan 2019 05:10 )             39.3     01-24    137  |  113<H>  |  6<L>  ----------------------------<  214<H>  Test not performed SPECIMEN SEVERELY HEMOLYZED   |  14<L>  |  0.49<L>    Ca    9.7      24 Jan 2019 05:10  Phos  2.6     01-24  Mg     1.9     01-24    TPro  5.9<L>  /  Alb  1.9<L>  /  TBili  0.2  /  DBili  x   /  AST  52<H>  /  ALT  13  /  AlkPhos  65  01-23    LIVER FUNCTIONS - ( 23 Jan 2019 22:55 )  Alb: 1.9 g/dL / Pro: 5.9 g/dL / ALK PHOS: 65 u/L / ALT: 13 u/L / AST: 52 u/L / GGT: x           PTT - ( 24 Jan 2019 09:38 )  PTT:45.2 SEC      Imaging: Chief Complaint:  Patient is a 88y old  Female who presents with a chief complaint of transfer from [facility] (23 Jan 2019 23:36)    H/O: Osteoarthritis  GERD (Gastroesophageal Reflux Disease)  ? Chronic CHF  Diabetes Mellitus  HTN (Hypertension), Benign  Diabetic Acidosis, Type II  History of Arthroscopy of Knee -  right  History of Arthroscopy  History of Total Knee  left in 2003     HPI: **pt is demented/nonverbal, [facility]     88F hx of advanced dementia (at baseline, bedbound, nonverbal, can track and take PO feeds), HTN, HLD, DM, who presented to [facility] on 1/19 with altered mental status, cough, and shortness of breath. Pt. had productive cough with yellow sputum, associated with SOB, and experienced subjective fevers at home over the past three days prior to admission. She also had decreased PO intake, home diet usually consists of pureed food and thickened fluids, which pt. was noted to be coughing with. In addition, pts. urine had also appeared pink to HHA/daughter. Mental status also worsened with pt. becoming more lethargic prompting family to bring her to the ED. Upon arrival pt was placed on BIPAP and admitted to MICU. CTH was negative for acute bleed, and encephalopathy was attributed to sepsis possibly 2/2 PNA. She was started on ceftriaxone for possible UTI  and PNA. Chest XRAY on admission showed no acute pulmonary abnormality, however CXR from 1/22 showed increase in right basilar opacity. Further infectious workup - BCx remained negative, UCx positive for proteus sensitive to ceftriaxone. Course was complicated by acute LLE DVT for which she was started on a patient specific heparin drip (goal 50-60, due to high risk of bleeding). She was also hypernatremic to the 150s, and started on D5W initially, later switched to D5 1/2 NS, with Na level 143 prior to transfer. Multiple attempts to place an NG tube was unsuccessful given large hiatal hernia. Mental status gradually improved, BIPAP de-escalated to NC, and pt. was transferred to medicine floors. Per family pt. was back to her baseline. Floor course c/b episode of respiratory distress, however BIPAP was not started given secretions and lethargy, oxygenation wnl with 5L NC. ABG was unremarkable for CO2 retention. Speech and swallow was consulted, not recommended for PEG tube. Palliative care was consulted however family requested transfer to [facility]. GI consulted for dysphagia and possible PEG placement     On arrival to [facility] - pt. on heparin drip at 490 units/hr and D5 1/2 NS @50cc/hr. PTT prior to transfer noted to be 58. (23 Jan 2019 23:36)      No Known Allergies      dextrose 40% Gel 15 Gram(s) Oral once PRN  dextrose 5% + sodium chloride 0.45%. 1000 milliLiter(s) IV Continuous <Continuous>  dextrose 5%. 1000 milliLiter(s) IV Continuous <Continuous>  dextrose 50% Injectable 12.5 Gram(s) IV Push once  dextrose 50% Injectable 25 Gram(s) IV Push once  dextrose 50% Injectable 25 Gram(s) IV Push once  glucagon  Injectable 1 milliGRAM(s) IntraMuscular once PRN  heparin  Infusion 700 Unit(s)/Hr IV Continuous <Continuous>  influenza   Vaccine 0.5 milliLiter(s) IntraMuscular once  insulin lispro (HumaLOG) corrective regimen sliding scale   SubCutaneous every 6 hours        FAMILY HISTORY:  No pertinent family history in first degree relatives        Review of Systems: *nonverbal      Relevant Family History:   n/c    Relevant Social History: n/c      Physical Exam:    Vital Signs:  Vital Signs Last 24 Hrs  T(C): 36.4 (24 Jan 2019 05:10), Max: 36.4 (24 Jan 2019 05:10)  T(F): 97.6 (24 Jan 2019 05:10), Max: 97.6 (24 Jan 2019 05:10)  HR: 65 (24 Jan 2019 10:31) (58 - 76)  BP: 142/58 (24 Jan 2019 10:31) (127/80 - 169/75)  BP(mean): --  RR: 16 (24 Jan 2019 05:10) (16 - 16)  SpO2: 95% (24 Jan 2019 10:31) (85% - 100%)  Daily Height in cm: 152.4 (23 Jan 2019 21:52)    Daily     General:  Appears stated age, well-groomed, nad  HEENT:  NC/AT,  conjunctivae clear and pink, no thyromegaly, nodules, adenopathy, no JVD  Chest:  Full & symmetric excursion, no increased effort, breath sounds clear  Cardiovascular:  Regular rhythm, S1, S2, no murmur/rub/S3/S4, no abdominal bruit, no edema  Abdomen:  Soft, non-tender, non-distended, normoactive bowel sounds,  no masses ,no hepatosplenomeagaly, no signs of chronic liver disease  Extremities:  no cyanosis,clubbing or edema  Skin:  No rash/erythema/ecchymoses/petechiae/wounds/abscess/warm/dry  Neuro/Psych:  A&O x0, no asterixis, no tremor, no encephalopathy    Laboratory:                            11.7   7.14  )-----------( 52       ( 24 Jan 2019 05:10 )             39.3     01-24    137  |  113<H>  |  6<L>  ----------------------------<  214<H>  Test not performed SPECIMEN SEVERELY HEMOLYZED   |  14<L>  |  0.49<L>    Ca    9.7      24 Jan 2019 05:10  Phos  2.6     01-24  Mg     1.9     01-24    TPro  5.9<L>  /  Alb  1.9<L>  /  TBili  0.2  /  DBili  x   /  AST  52<H>  /  ALT  13  /  AlkPhos  65  01-23    LIVER FUNCTIONS - ( 23 Jan 2019 22:55 )  Alb: 1.9 g/dL / Pro: 5.9 g/dL / ALK PHOS: 65 u/L / ALT: 13 u/L / AST: 52 u/L / GGT: x           PTT - ( 24 Jan 2019 09:38 )  PTT:45.2 SEC      Imaging: no weight-bearing restrictions

## 2023-03-10 NOTE — PROGRESS NOTE ADULT - PROBLEM/PLAN-5
Awake
DISPLAY PLAN FREE TEXT

## 2025-03-08 NOTE — DIETITIAN INITIAL EVALUATION ADULT. - PROBLEM SELECTOR PLAN 9
Chief complaint:  Generalized weakness    HPI:     Patient is a 89 year old female with past medical history of chronic kidney disease stage II, essential hypertension, hyperlipidemia, right-sided breast cancer with osseous metastatic disease status post recent chemotherapy presents to the emergency department for further evaluation of weakness that started earlier today.  Patient says that she feels weak all over and has vague symptoms of feeling weak even in her head but denies any headache.  Patient also consented to a nonproductive cough. Denies any dysuria. No shortness of breathing or chest pain.    At emergency department:  Vitals fairly unremarkable.  Labs remarkable for neutropenia and leukopenia.  According to family, patient had fever of 100.4 yesterday.  Work up remarkable for influenza A.  Given Tamiflu in ED.    Patient then admitted for further evaluation.      Patient Active Problem List    Diagnosis Date Noted    Anemia 01/14/2025     Priority: Low    Leukopenia 01/14/2025     Priority: Low    Ptosis of right eyelid 01/13/2025     Priority: Low    Anxiety 07/03/2024     Priority: Low    Malignant neoplasm involving both nipple and areola of right breast in female, estrogen receptor positive (CMD) 10/26/2023     Priority: Low    Generalized weakness 10/07/2022     Priority: Low    DJD (degenerative joint disease) of knee 08/22/2014     Priority: Low    Glaucoma 06/23/2014     Priority: Low    Edema 11/02/2011     Priority: Low    Stress incontinence, female 11/02/2011     Priority: Low    Impaired fasting glucose 11/02/2011     Priority: Low    Hypertension, benign      Priority: Low    Hyperlipidemia      Priority: Low     Past Medical History:   Diagnosis Date    Arthritis     Benign hypertension with chronic kidney disease, stage II 10/20/2022    Colon polyps     Diverticulitis of colon (without mention of hemorrhage)(562.11)     Diverticulosis of colon (without mention of hemorrhage) 06/30/2009     Ganglion cyst     right hand    Glaucoma     HTN (hypertension)     Hyperlipidemia     Hypotension 10/20/2022    Impaired fasting glucose 2011    Malignant neoplasm  (CMD)     Mixed incontinence urge and stress (male)(female)     Personal history of gallstones 2011    No symptoms     Pneumonia     Renal insufficiency     Unspecified essential hypertension     Unspecified glaucoma(365.9)     Urinary incontinence     Urinary tract infection     HX      Past Surgical History:   Procedure Laterality Date    Breast biopsy Right 10/11/2023    right breast x 3    Colonoscopy w/ polypectomy  2009    Polyp, diverticulosisf/u 5 yrs,Dr Tran    Cyst removal      SCALP    D and c      for abnormal bleeding    Dexa bone density axial skeleton  2003    Eye surgery  2005    BILATERAL CATARACT SURGERY    Joint replacement      RIGHT TOTAL KNEE REPLACEMENT    Joint replacement  2012    LEFT TOTAL HIP REPLACEMENT    Joint replacement  2011    RIGHT TOTAL HIP REPLACEMENT    Joint replacement  2014    left knee    Left heart cath,percutaneous      Cardiac Cath      (Not in a hospital admission)    ALLERGIES:   Allergen Reactions    Cephalosporins      wheezing    Maxzide Other (See Comments)     hyponatremia    Penicillins HIVES      Social History     Tobacco Use    Smoking status: Former     Current packs/day: 0.00     Average packs/day: 1 pack/day for 30.0 years (30.0 ttl pk-yrs)     Types: Cigarettes     Start date: 3/7/1952     Quit date: 3/7/1982     Years since quittin.0    Smokeless tobacco: Never   Substance Use Topics    Alcohol use: Yes     Alcohol/week: 0.0 - 1.0 standard drinks of alcohol     Comment: very rare      Family History   Problem Relation Age of Onset    Cancer Mother 48        ? Breast vs Lung    Cancer Brother         Lung, smoker    Stroke Brother     Cancer, Ovarian Daughter 50        Treated in [place], hysterectomy and chemotherapy    Cancer Maternal  Aunt         unknown orgin of cancer at unknown age    Cancer Other         cousin #1 of above maternal aunt, unknown orgin of cancer at unknown age.    Cancer Maternal Cousin         cousin #2 of above maternal aunt, unknown orgin of cancer at unknown age.      Review of Systems  All systems reviewed and negative except for those mentioned in the history of present illness    Objective:     Vitals:    03/08/25 0020   BP: (!) 144/64   Pulse: 61   Resp: 18   Temp:      No intake/output data recorded.  No intake/output data recorded.    .Physical Exam:   General:  Awake, Alert, Oriented x 3.  No acute distress.  Conversational.  HEENT:  NCAT, PERRLA, EOMI.  Sclerae nonicteric.  Conjunctivae pink.  Neck:  Supple.  No masses palpable.  No thyromegaly.   CVS:  Regular rate and rhythm.  S1-S2 normal.  No murmurs, rubs or gallops.  No JVD.   Pulm:  Lungs are clear to auscultation bilaterally.  No wheezes or crackles heard.   Abd:  Soft, nondistended, nontender, no masses palpable.  No hepatosplenomegaly.  Bowel sounds present.   Ext:  No lower extremity edema, peripheral pulses intact.  Msk:  No joint swelling, redness or tenderness appreciated.   Skin:  Warm and dry to touch.  No rashes or lesions noted.  Neuro:  Cranial nerve 2-12 grossly intact.  Nonfocal.    Assessment:     Generalized weakness  Increased cough  Influenza infection  Leukopenia and neutropenia  -Reported fever 100.4 PTA  -History of breast cancer with osseous metastatic disease status post recent chemotherapy  -Attributing generalized weakness and cough likely secondary to influenza infection.  Started on Tamiflu, will continue.  -Attributing leukopenia and neutropenia likely secondary to influenza infection as well as most likely recent chemotherapy for breast cancer with metastatic osseous disease.  Will continue remainder of infectious workup.  Will consult hematology service for continued monitoring and concern for fever in the setting of leukopenia  and neutropenia PTA.  Will be given one-time dose of IV antibiotics while being mindful of the patient allergy.  Continue remainder of infectious workup.  Consider resume antibiotics if patient spikes another fever.  Appreciate further recommendations from oncology service.  Continue neutropenic precautions.    -Continue supportive treatment  -Continue PT and OT    Chronic medical problems:  Chronic kidney disease stage II  Essential hypertension  Hyperlipidemia  Right-sided breast cancer with osseous metastatic disease    Continue remainder of home medications.  Awaiting pharmacy to reconcile home regimen.  Continue heparin for DVT prophylaxis.  Appreciate further recommendations from consultant services.    Larry Mitchell AM hospitalist   - attempted to reach daughter Cait Iglseias, however no answer  - need goals of care discussion regarding code status, confirm whether family wants PEG, possible palliative consult based on conversation  - FULL CODE, given that unable to reach family, and not documentation to suggest otherwise on transfer paperwork